# Patient Record
Sex: FEMALE | Race: WHITE | Employment: OTHER | URBAN - METROPOLITAN AREA
[De-identification: names, ages, dates, MRNs, and addresses within clinical notes are randomized per-mention and may not be internally consistent; named-entity substitution may affect disease eponyms.]

---

## 2017-08-16 ENCOUNTER — HOSPITAL ENCOUNTER (OUTPATIENT)
Dept: MAMMOGRAPHY | Age: 57
Discharge: HOME | End: 2017-08-16

## 2017-08-16 DIAGNOSIS — Z12.31 VISIT FOR SCREENING MAMMOGRAM: ICD-10-CM

## 2017-09-05 ENCOUNTER — HOSPITAL ENCOUNTER (INPATIENT)
Age: 57
LOS: 2 days | Discharge: HOME | DRG: 313 | End: 2017-09-07
Attending: EMERGENCY MEDICINE | Admitting: INTERNAL MEDICINE
Payer: COMMERCIAL

## 2017-09-05 ENCOUNTER — APPOINTMENT (OUTPATIENT)
Dept: ULTRASOUND IMAGING | Age: 57
DRG: 313 | End: 2017-09-05
Payer: COMMERCIAL

## 2017-09-05 ENCOUNTER — APPOINTMENT (OUTPATIENT)
Dept: CT IMAGING | Age: 57
DRG: 313 | End: 2017-09-05
Payer: COMMERCIAL

## 2017-09-05 ENCOUNTER — APPOINTMENT (OUTPATIENT)
Dept: GENERAL RADIOLOGY | Age: 57
DRG: 313 | End: 2017-09-05
Payer: COMMERCIAL

## 2017-09-05 DIAGNOSIS — R07.89 ATYPICAL CHEST PAIN: Primary | ICD-10-CM

## 2017-09-05 DIAGNOSIS — R74.8 ELEVATED LIVER ENZYMES: ICD-10-CM

## 2017-09-05 PROBLEM — R10.13 EPIGASTRIC ABDOMINAL PAIN: Status: ACTIVE | Noted: 2017-09-05

## 2017-09-05 PROBLEM — E11.9 TYPE 2 DIABETES MELLITUS WITHOUT COMPLICATION (HCC): Status: ACTIVE | Noted: 2017-09-05

## 2017-09-05 PROBLEM — E87.29 HIGH ANION GAP METABOLIC ACIDOSIS: Status: ACTIVE | Noted: 2017-09-05

## 2017-09-05 PROBLEM — I10 ESSENTIAL HYPERTENSION: Status: ACTIVE | Noted: 2017-09-05

## 2017-09-05 PROBLEM — R79.89 ELEVATED LFTS: Status: ACTIVE | Noted: 2017-09-05

## 2017-09-05 LAB
ALBUMIN SERPL-MCNC: 4.5 G/DL (ref 3.5–5.1)
ALP BLD-CCNC: 372 U/L (ref 38–126)
ALT SERPL-CCNC: 97 U/L (ref 11–66)
ANION GAP SERPL CALCULATED.3IONS-SCNC: 17 MEQ/L (ref 8–16)
APTT: 28.7 SECONDS (ref 22–38)
AST SERPL-CCNC: 71 U/L (ref 5–40)
BASOPHILS # BLD: 0.8 %
BASOPHILS ABSOLUTE: 0.1 THOU/MM3 (ref 0–0.1)
BILIRUB SERPL-MCNC: 0.6 MG/DL (ref 0.3–1.2)
BILIRUBIN DIRECT: < 0.2 MG/DL (ref 0–0.3)
BILIRUBIN URINE: NEGATIVE
BLOOD, URINE: NEGATIVE
BUN BLDV-MCNC: 28 MG/DL (ref 7–22)
CALCIUM SERPL-MCNC: 9.9 MG/DL (ref 8.5–10.5)
CHARACTER, URINE: CLEAR
CHLORIDE BLD-SCNC: 95 MEQ/L (ref 98–111)
CO2: 25 MEQ/L (ref 23–33)
COLOR: YELLOW
CREAT SERPL-MCNC: 0.8 MG/DL (ref 0.4–1.2)
D-DIMER QUANTITATIVE: 310 NG/ML FEU (ref 0–500)
EOSINOPHIL # BLD: 7.5 %
EOSINOPHILS ABSOLUTE: 1 THOU/MM3 (ref 0–0.4)
ETHYL ALCOHOL, SERUM: < 0.01 %
GFR SERPL CREATININE-BSD FRML MDRD: 73 ML/MIN/1.73M2
GLUCOSE BLD-MCNC: 114 MG/DL (ref 70–108)
GLUCOSE BLD-MCNC: 121 MG/DL (ref 70–108)
GLUCOSE BLD-MCNC: 123 MG/DL (ref 70–108)
GLUCOSE BLD-MCNC: 131 MG/DL (ref 70–108)
GLUCOSE URINE: NEGATIVE MG/DL
HAV IGM SER IA-ACNC: NEGATIVE
HAV IGM SER IA-ACNC: NEGATIVE
HCT VFR BLD CALC: 46 % (ref 37–47)
HEMOGLOBIN: 15.4 GM/DL (ref 12–16)
HEPATITIS B CORE IGM ANTIBODY: NEGATIVE
HEPATITIS B CORE IGM ANTIBODY: NEGATIVE
HEPATITIS B SURFACE ANTIGEN: NEGATIVE
HEPATITIS B SURFACE ANTIGEN: NEGATIVE
HEPATITIS C ANTIBODY: NEGATIVE
HEPATITIS C ANTIBODY: NEGATIVE
INR BLD: 1.03 (ref 0.85–1.13)
KETONES, URINE: NEGATIVE
LACTIC ACID: 1 MMOL/L (ref 0.5–2.2)
LEUKOCYTE ESTERASE, URINE: NEGATIVE
LIPASE: 31.3 U/L (ref 5.6–51.3)
LYMPHOCYTES # BLD: 29.7 %
LYMPHOCYTES ABSOLUTE: 3.9 THOU/MM3 (ref 1–4.8)
MAGNESIUM: 2 MG/DL (ref 1.6–2.4)
MCH RBC QN AUTO: 29.9 PG (ref 27–31)
MCHC RBC AUTO-ENTMCNC: 33.4 GM/DL (ref 33–37)
MCV RBC AUTO: 89.5 FL (ref 81–99)
MONOCYTES # BLD: 8 %
MONOCYTES ABSOLUTE: 1 THOU/MM3 (ref 0.4–1.3)
NITRITE, URINE: NEGATIVE
NUCLEATED RED BLOOD CELLS: 0 /100 WBC
OSMOLALITY CALCULATION: 281.1 MOSMOL/KG (ref 275–300)
PDW BLD-RTO: 13.9 % (ref 11.5–14.5)
PH UA: 5
PLATELET # BLD: 332 THOU/MM3 (ref 130–400)
PMV BLD AUTO: 8.1 MCM (ref 7.4–10.4)
POTASSIUM SERPL-SCNC: 3.8 MEQ/L (ref 3.5–5.2)
PRO-BNP: 15.2 PG/ML (ref 0–900)
PROTEIN UA: NEGATIVE
RBC # BLD: 5.14 MILL/MM3 (ref 4.2–5.4)
RBC # BLD: NORMAL 10*6/UL
SEG NEUTROPHILS: 54 %
SEGMENTED NEUTROPHILS ABSOLUTE COUNT: 7 THOU/MM3 (ref 1.8–7.7)
SODIUM BLD-SCNC: 137 MEQ/L (ref 135–145)
SPECIFIC GRAVITY, URINE: > 1.03 (ref 1–1.03)
TOTAL PROTEIN: 8 G/DL (ref 6.1–8)
TROPONIN T: < 0.01 NG/ML
TROPONIN T: < 0.01 NG/ML
UROBILINOGEN, URINE: 0.2 EU/DL
WBC # BLD: 13 THOU/MM3 (ref 4.8–10.8)

## 2017-09-05 PROCEDURE — 74177 CT ABD & PELVIS W/CONTRAST: CPT

## 2017-09-05 PROCEDURE — 93005 ELECTROCARDIOGRAM TRACING: CPT

## 2017-09-05 PROCEDURE — 6360000002 HC RX W HCPCS: Performed by: INTERNAL MEDICINE

## 2017-09-05 PROCEDURE — 36415 COLL VENOUS BLD VENIPUNCTURE: CPT

## 2017-09-05 PROCEDURE — C9113 INJ PANTOPRAZOLE SODIUM, VIA: HCPCS | Performed by: EMERGENCY MEDICINE

## 2017-09-05 PROCEDURE — G0378 HOSPITAL OBSERVATION PER HR: HCPCS

## 2017-09-05 PROCEDURE — 85730 THROMBOPLASTIN TIME PARTIAL: CPT

## 2017-09-05 PROCEDURE — G0480 DRUG TEST DEF 1-7 CLASSES: HCPCS

## 2017-09-05 PROCEDURE — 80053 COMPREHEN METABOLIC PANEL: CPT

## 2017-09-05 PROCEDURE — 6360000004 HC RX CONTRAST MEDICATION: Performed by: EMERGENCY MEDICINE

## 2017-09-05 PROCEDURE — 80074 ACUTE HEPATITIS PANEL: CPT

## 2017-09-05 PROCEDURE — 2580000003 HC RX 258: Performed by: INTERNAL MEDICINE

## 2017-09-05 PROCEDURE — 85610 PROTHROMBIN TIME: CPT

## 2017-09-05 PROCEDURE — 71275 CT ANGIOGRAPHY CHEST: CPT

## 2017-09-05 PROCEDURE — 85025 COMPLETE CBC W/AUTO DIFF WBC: CPT

## 2017-09-05 PROCEDURE — 82248 BILIRUBIN DIRECT: CPT

## 2017-09-05 PROCEDURE — 1200000003 HC TELEMETRY R&B

## 2017-09-05 PROCEDURE — 76705 ECHO EXAM OF ABDOMEN: CPT

## 2017-09-05 PROCEDURE — C9113 INJ PANTOPRAZOLE SODIUM, VIA: HCPCS | Performed by: INTERNAL MEDICINE

## 2017-09-05 PROCEDURE — 71020 XR CHEST STANDARD TWO VW: CPT

## 2017-09-05 PROCEDURE — 96361 HYDRATE IV INFUSION ADD-ON: CPT

## 2017-09-05 PROCEDURE — 83880 ASSAY OF NATRIURETIC PEPTIDE: CPT

## 2017-09-05 PROCEDURE — 96376 TX/PRO/DX INJ SAME DRUG ADON: CPT

## 2017-09-05 PROCEDURE — 99285 EMERGENCY DEPT VISIT HI MDM: CPT

## 2017-09-05 PROCEDURE — 6370000000 HC RX 637 (ALT 250 FOR IP): Performed by: INTERNAL MEDICINE

## 2017-09-05 PROCEDURE — 83605 ASSAY OF LACTIC ACID: CPT

## 2017-09-05 PROCEDURE — B32S1ZZ COMPUTERIZED TOMOGRAPHY (CT SCAN) OF RIGHT PULMONARY ARTERY USING LOW OSMOLAR CONTRAST: ICD-10-PCS | Performed by: RADIOLOGY

## 2017-09-05 PROCEDURE — 6370000000 HC RX 637 (ALT 250 FOR IP): Performed by: EMERGENCY MEDICINE

## 2017-09-05 PROCEDURE — 83735 ASSAY OF MAGNESIUM: CPT

## 2017-09-05 PROCEDURE — 96375 TX/PRO/DX INJ NEW DRUG ADDON: CPT

## 2017-09-05 PROCEDURE — 84484 ASSAY OF TROPONIN QUANT: CPT

## 2017-09-05 PROCEDURE — 83690 ASSAY OF LIPASE: CPT

## 2017-09-05 PROCEDURE — 81003 URINALYSIS AUTO W/O SCOPE: CPT

## 2017-09-05 PROCEDURE — 99223 1ST HOSP IP/OBS HIGH 75: CPT | Performed by: INTERNAL MEDICINE

## 2017-09-05 PROCEDURE — 82948 REAGENT STRIP/BLOOD GLUCOSE: CPT

## 2017-09-05 PROCEDURE — 2580000003 HC RX 258: Performed by: EMERGENCY MEDICINE

## 2017-09-05 PROCEDURE — 6360000002 HC RX W HCPCS: Performed by: EMERGENCY MEDICINE

## 2017-09-05 PROCEDURE — 96374 THER/PROPH/DIAG INJ IV PUSH: CPT

## 2017-09-05 PROCEDURE — 85379 FIBRIN DEGRADATION QUANT: CPT

## 2017-09-05 PROCEDURE — B32T1ZZ COMPUTERIZED TOMOGRAPHY (CT SCAN) OF LEFT PULMONARY ARTERY USING LOW OSMOLAR CONTRAST: ICD-10-PCS | Performed by: RADIOLOGY

## 2017-09-05 RX ORDER — ACETAMINOPHEN 325 MG/1
650 TABLET ORAL EVERY 4 HOURS PRN
Status: DISCONTINUED | OUTPATIENT
Start: 2017-09-05 | End: 2017-09-05

## 2017-09-05 RX ORDER — OXYCODONE HYDROCHLORIDE 5 MG/1
5 TABLET ORAL EVERY 4 HOURS PRN
Status: DISCONTINUED | OUTPATIENT
Start: 2017-09-05 | End: 2017-09-07 | Stop reason: HOSPADM

## 2017-09-05 RX ORDER — ONDANSETRON 2 MG/ML
4 INJECTION INTRAMUSCULAR; INTRAVENOUS ONCE
Status: COMPLETED | OUTPATIENT
Start: 2017-09-05 | End: 2017-09-05

## 2017-09-05 RX ORDER — CITALOPRAM 20 MG/1
20 TABLET ORAL DAILY
Status: DISCONTINUED | OUTPATIENT
Start: 2017-09-05 | End: 2017-09-07 | Stop reason: HOSPADM

## 2017-09-05 RX ORDER — MORPHINE SULFATE 4 MG/ML
4 INJECTION, SOLUTION INTRAMUSCULAR; INTRAVENOUS ONCE
Status: COMPLETED | OUTPATIENT
Start: 2017-09-05 | End: 2017-09-05

## 2017-09-05 RX ORDER — 0.9 % SODIUM CHLORIDE 0.9 %
1000 INTRAVENOUS SOLUTION INTRAVENOUS ONCE
Status: COMPLETED | OUTPATIENT
Start: 2017-09-05 | End: 2017-09-05

## 2017-09-05 RX ORDER — SODIUM CHLORIDE 9 MG/ML
INJECTION, SOLUTION INTRAVENOUS CONTINUOUS
Status: DISCONTINUED | OUTPATIENT
Start: 2017-09-05 | End: 2017-09-07 | Stop reason: HOSPADM

## 2017-09-05 RX ORDER — SUCRALFATE ORAL 1 G/10ML
1 SUSPENSION ORAL ONCE
Status: COMPLETED | OUTPATIENT
Start: 2017-09-05 | End: 2017-09-05

## 2017-09-05 RX ORDER — ONDANSETRON 2 MG/ML
4 INJECTION INTRAMUSCULAR; INTRAVENOUS EVERY 6 HOURS PRN
Status: DISCONTINUED | OUTPATIENT
Start: 2017-09-05 | End: 2017-09-07 | Stop reason: HOSPADM

## 2017-09-05 RX ORDER — MORPHINE SULFATE 2 MG/ML
2 INJECTION, SOLUTION INTRAMUSCULAR; INTRAVENOUS
Status: DISCONTINUED | OUTPATIENT
Start: 2017-09-05 | End: 2017-09-07 | Stop reason: HOSPADM

## 2017-09-05 RX ORDER — OXYCODONE HYDROCHLORIDE 5 MG/1
10 TABLET ORAL EVERY 4 HOURS PRN
Status: DISCONTINUED | OUTPATIENT
Start: 2017-09-05 | End: 2017-09-07 | Stop reason: HOSPADM

## 2017-09-05 RX ORDER — PANTOPRAZOLE SODIUM 40 MG/10ML
40 INJECTION, POWDER, LYOPHILIZED, FOR SOLUTION INTRAVENOUS DAILY
Status: DISCONTINUED | OUTPATIENT
Start: 2017-09-05 | End: 2017-09-07 | Stop reason: HOSPADM

## 2017-09-05 RX ORDER — SODIUM CHLORIDE 0.9 % (FLUSH) 0.9 %
10 SYRINGE (ML) INJECTION EVERY 12 HOURS SCHEDULED
Status: DISCONTINUED | OUTPATIENT
Start: 2017-09-05 | End: 2017-09-07 | Stop reason: HOSPADM

## 2017-09-05 RX ORDER — PANTOPRAZOLE SODIUM 40 MG/10ML
40 INJECTION, POWDER, LYOPHILIZED, FOR SOLUTION INTRAVENOUS ONCE
Status: COMPLETED | OUTPATIENT
Start: 2017-09-05 | End: 2017-09-05

## 2017-09-05 RX ORDER — LISINOPRIL 20 MG/1
20 TABLET ORAL DAILY
Status: DISCONTINUED | OUTPATIENT
Start: 2017-09-05 | End: 2017-09-07 | Stop reason: HOSPADM

## 2017-09-05 RX ORDER — OMEPRAZOLE 10 MG/1
40 CAPSULE, DELAYED RELEASE ORAL PRN
COMMUNITY
End: 2019-02-08

## 2017-09-05 RX ORDER — SODIUM CHLORIDE 0.9 % (FLUSH) 0.9 %
10 SYRINGE (ML) INJECTION PRN
Status: DISCONTINUED | OUTPATIENT
Start: 2017-09-05 | End: 2017-09-07 | Stop reason: HOSPADM

## 2017-09-05 RX ORDER — MORPHINE SULFATE 4 MG/ML
4 INJECTION, SOLUTION INTRAMUSCULAR; INTRAVENOUS
Status: DISCONTINUED | OUTPATIENT
Start: 2017-09-05 | End: 2017-09-07 | Stop reason: HOSPADM

## 2017-09-05 RX ADMIN — MORPHINE SULFATE 4 MG: 4 INJECTION, SOLUTION INTRAMUSCULAR; INTRAVENOUS at 06:16

## 2017-09-05 RX ADMIN — SUCRALFATE 1 G: 1 SUSPENSION ORAL at 04:42

## 2017-09-05 RX ADMIN — IOPAMIDOL 80 ML: 755 INJECTION, SOLUTION INTRAVENOUS at 05:55

## 2017-09-05 RX ADMIN — MORPHINE SULFATE 2 MG: 2 INJECTION, SOLUTION INTRAMUSCULAR; INTRAVENOUS at 17:48

## 2017-09-05 RX ADMIN — SODIUM CHLORIDE: 9 INJECTION, SOLUTION INTRAVENOUS at 22:15

## 2017-09-05 RX ADMIN — SODIUM CHLORIDE: 9 INJECTION, SOLUTION INTRAVENOUS at 11:18

## 2017-09-05 RX ADMIN — ENOXAPARIN SODIUM 40 MG: 40 INJECTION SUBCUTANEOUS at 11:54

## 2017-09-05 RX ADMIN — ONDANSETRON 4 MG: 2 INJECTION INTRAMUSCULAR; INTRAVENOUS at 04:27

## 2017-09-05 RX ADMIN — Medication 10 ML: at 20:45

## 2017-09-05 RX ADMIN — OXYCODONE HYDROCHLORIDE 10 MG: 5 TABLET ORAL at 11:17

## 2017-09-05 RX ADMIN — PANTOPRAZOLE SODIUM 40 MG: 40 INJECTION, POWDER, FOR SOLUTION INTRAVENOUS at 17:42

## 2017-09-05 RX ADMIN — MORPHINE SULFATE 4 MG: 4 INJECTION, SOLUTION INTRAMUSCULAR; INTRAVENOUS at 23:04

## 2017-09-05 RX ADMIN — HYDROMORPHONE HYDROCHLORIDE 1 MG: 1 INJECTION, SOLUTION INTRAMUSCULAR; INTRAVENOUS; SUBCUTANEOUS at 07:21

## 2017-09-05 RX ADMIN — ONDANSETRON 4 MG: 2 INJECTION INTRAMUSCULAR; INTRAVENOUS at 11:54

## 2017-09-05 RX ADMIN — SODIUM CHLORIDE 8 MG/HR: 9 INJECTION, SOLUTION INTRAVENOUS at 11:54

## 2017-09-05 RX ADMIN — MORPHINE SULFATE 4 MG: 4 INJECTION, SOLUTION INTRAMUSCULAR; INTRAVENOUS at 20:43

## 2017-09-05 RX ADMIN — PANTOPRAZOLE SODIUM 40 MG: 40 INJECTION, POWDER, FOR SOLUTION INTRAVENOUS at 04:27

## 2017-09-05 RX ADMIN — Medication 10 ML: at 23:06

## 2017-09-05 RX ADMIN — SODIUM CHLORIDE 1000 ML: 9 INJECTION, SOLUTION INTRAVENOUS at 04:27

## 2017-09-05 RX ADMIN — MORPHINE SULFATE 4 MG: 4 INJECTION, SOLUTION INTRAMUSCULAR; INTRAVENOUS at 05:08

## 2017-09-05 RX ADMIN — Medication 30 ML: at 04:42

## 2017-09-05 RX ADMIN — MORPHINE SULFATE 2 MG: 2 INJECTION, SOLUTION INTRAMUSCULAR; INTRAVENOUS at 13:34

## 2017-09-05 ASSESSMENT — PAIN DESCRIPTION - LOCATION
LOCATION: ABDOMEN
LOCATION: ABDOMEN

## 2017-09-05 ASSESSMENT — ENCOUNTER SYMPTOMS
EYE ITCHING: 0
EYE DISCHARGE: 0
EYE PAIN: 0
BACK PAIN: 0
ABDOMINAL PAIN: 0
VOICE CHANGE: 0
COUGH: 0
EYE REDNESS: 0
VOMITING: 0
SINUS PRESSURE: 0
CHEST TIGHTNESS: 0
ABDOMINAL DISTENTION: 0
NAUSEA: 0
SHORTNESS OF BREATH: 0
WHEEZING: 0
CONSTIPATION: 0
DIARRHEA: 0
TROUBLE SWALLOWING: 0
RHINORRHEA: 0
PHOTOPHOBIA: 0
CHOKING: 0
BLOOD IN STOOL: 0
SORE THROAT: 0

## 2017-09-05 ASSESSMENT — PAIN SCALES - GENERAL
PAINLEVEL_OUTOF10: 5
PAINLEVEL_OUTOF10: 6
PAINLEVEL_OUTOF10: 6
PAINLEVEL_OUTOF10: 7
PAINLEVEL_OUTOF10: 9
PAINLEVEL_OUTOF10: 8
PAINLEVEL_OUTOF10: 0
PAINLEVEL_OUTOF10: 8
PAINLEVEL_OUTOF10: 6
PAINLEVEL_OUTOF10: 8
PAINLEVEL_OUTOF10: 7
PAINLEVEL_OUTOF10: 7
PAINLEVEL_OUTOF10: 9
PAINLEVEL_OUTOF10: 8
PAINLEVEL_OUTOF10: 8

## 2017-09-05 ASSESSMENT — PAIN DESCRIPTION - FREQUENCY: FREQUENCY: CONTINUOUS

## 2017-09-05 ASSESSMENT — PAIN DESCRIPTION - ONSET: ONSET: ON-GOING

## 2017-09-05 ASSESSMENT — PAIN DESCRIPTION - ORIENTATION: ORIENTATION: MID

## 2017-09-05 ASSESSMENT — PAIN DESCRIPTION - DESCRIPTORS: DESCRIPTORS: SHARP

## 2017-09-05 ASSESSMENT — PAIN DESCRIPTION - PAIN TYPE
TYPE: ACUTE PAIN

## 2017-09-05 ASSESSMENT — PAIN DESCRIPTION - PROGRESSION: CLINICAL_PROGRESSION: NOT CHANGED

## 2017-09-05 NOTE — ED NOTES
Pt stable and off to Radiology US  via ED cart with [employer] tech. Pt states no concerns and vitals stable.         Sid Zeng RN  09/05/17 7319

## 2017-09-05 NOTE — ED NOTES
Pt stable and off to Radiology via ED cart with [facility] tech. Pt states no concerns and vitals stable.         Aaron Frias RN  09/05/17 8714

## 2017-09-05 NOTE — ED PROVIDER NOTES
Patient was a signout from Dr. Aracelis Garrison. Patient came in initially with complaint of epigastric pain that started two hrs prior to presentation. Patient had an extensive workup done by Dr. Aracelis Garrison, ruled out dissecting aortic aneurysm, PE, MI. She, however,had persistent pain, evaluation of her labs showed elevated elevated alk phos. Patient denies any history of cancer, gallbladder disease, liver disease. I did contact GI, patient has seen Dr. Enrike Chirinos in the past for colonoscopy, I discussed case with his group, they're willing to see patient as a consult consider EGD in the hospital.  I also had a discussion with the hospitalist, he will admit patient for further evaluation. Patient is currently stable, pain has reduced pain somewhat but still present.      Bautista Bedoya,   09/05/17 2415

## 2017-09-05 NOTE — IP AVS SNAPSHOT
Patient Information     Patient Name LUZMA Ramirez Amend 1960         This is your updated medication list to keep with you all times      TAKE these medications     citalopram 20 MG tablet   Commonly known as:  CELEXA       lisinopril 20 MG tablet   Commonly known as:  PRINIVIL;ZESTRIL       metFORMIN 500 MG tablet   Commonly known as:  GLUCOPHAGE       naproxen 500 MG tablet   Commonly known as:  NAPROSYN       omeprazole 10 MG delayed release capsule   Commonly known as:  PRILOSEC

## 2017-09-05 NOTE — IP AVS SNAPSHOT
This summary was created for you. Thank you for entrusting your care to us. The following information includes details about your hospital/visit stay along with steps you should take to help with your recovery once you leave the hospital.  In this packet, you will find information about the topics listed below:    · Instructions about your medications including a list of your home medications  · A summary of your hospital visit  · Follow-up appointments once you have left the hospital  · Your care plan at home      You may receive a survey regarding the care you received during your stay. Your input is valuable to us. We encourage you to complete and return your survey in the envelope provided. We hope you will choose us in the future for your healthcare needs. Patient Information     Patient Name LUZMA Castellanos 1960      Care Provided at:     Name Address Phone       [address] [phone]            Your Visit    Here you will find information about your visit, including the reason for your visit. Please take this sheet with you when you visit your doctor or other health care provider in the future. It will help determine the best possible medical care for you at that time. If you have any questions once you leave the hospital, please call the department phone number listed below. Why you were here     Your primary diagnosis was:  Epigastric Abdominal Pain    Your diagnoses also included:  High Anion Gap Metabolic Acidosis, Essential Hypertension, Type 2 Diabetes Mellitus Without Complication (Hcc), Elevated Lfts, Atypical Chest Pain      Visit Information     Date & Time Provider Department Dept. Phone    2017 Adriel Brown MD [facility] [phone]       Follow-up Appointments    Below is a list of your follow-up and future appointments.  This may not be a complete list as you may have made appointments directly with providers that we are not aware of or your providers may have made some for you. Please call your providers to confirm appointments. It is important to keep your appointments. Please bring your current insurance card, photo ID, co-pay, and all medication bottles to your appointment. If self-pay, payment is expected at the time of service. Follow-up Information     Follow up with Referring Not In System. Follow up with VIVIEN Esparza. Go on 9/20/2017. Specialty:  Gastroenterology    Why:  Ning Collins see Isabelle at 9:15AM.    Contact information:    Wilian Souza [facility] [address] [address] [phone]          Follow up with Tigist Peace. Brenek, CNP On 9/14/2017. Specialty:  Nurse Practitioner    Why:  Go see Kat Robles at [facility] information:    [address] [address] [phone]          Follow up with Martínez Gray MD. Go on 9/26/2017. Specialty:  Cardiology    Why:  Go to Dr. Tammy Zamorano office at 8:45AM. Bring insurance card, photo ID, and medication in the correct bottles. Contact information:    [place], [address] [address] [phone]        Future Appointments     9/20/2017 9:15 AM     Appointment with VIVIEN Esparza at [facility] ([phone])   Please arrive 15 minutes prior to appointment time, bring insurance card and photo ID. [address] [facility] [facility] [address]       9/26/2017 9:00 AM     Appointment with Martínez Gray MD at [facility] [address] ([phone])   Please arrive 15 minutes prior to appointment, bring insurance card and photo ID. Please arrive 15 minutes prior to appointment, bring insurance card and photo ID.   [address]  [address]  [facility] [address]         Preventive Care        Date Due    HIV screening is recommended for all people regardless of risk factors  aged 15-65 years at least once (lifetime) who have never been HIV tested.  1/24/1975 directed. This will help you recover and help prevent future problems. How can you care for yourself at home? · Rest until you feel better. · Take your medicine exactly as prescribed. Call your doctor if you think you are having a problem with your medicine. · Do not drive after taking a prescription pain medicine. When should you call for help? Call 911 if:  · You passed out (lost consciousness). · You have severe difficulty breathing. · You have symptoms of a heart attack. These may include:  ¨ Chest pain or pressure, or a strange feeling in your chest.  ¨ Sweating. ¨ Shortness of breath. ¨ Nausea or vomiting. ¨ Pain, pressure, or a strange feeling in your back, neck, jaw, or upper belly or in one or both shoulders or arms. ¨ Lightheadedness or sudden weakness. ¨ A fast or irregular heartbeat. After you call 911, the  may tell you to chew 1 adult-strength or 2 to 4 low-dose aspirin. Wait for an ambulance. Do not try to drive yourself. Call your doctor today if:  · You have any trouble breathing. · Your chest pain gets worse. · You are dizzy or lightheaded, or you feel like you may faint. · You are not getting better as expected. · You are having new or different chest pain. Where can you learn more? Go to [email or web address] and sign in to your Teedot account. Enter A120 in the Mobclix box to learn more about \"Chest Pain: Care Instructions. \"     If you do not have an account, please click on the \"Sign Up Now\" link. Current as of: March 20, 2017  Content Version: 11.3  © 7178-5563 [email or web address]. Care instructions adapted under license by [employer] [facility] ([facility]). If you have questions about a medical condition or this instruction, always ask your healthcare professional. [address]  any warranty or liability for your use of this information.             Teedot Signup [email or web address] allows you to send messages to your doctor, view your test results, renew your prescriptions, schedule appointments, view visit notes, and more. How Do I Sign Up? 1. In your Internet browser, go to [email or web address]  2. Click on the Sign Up Now link in the Sign In box. You will see the New Member Sign Up page. 3. Enter your [email or web address] Access Code exactly as it appears below. You will not need to use this code after youve completed the sign-up process. If you do not sign up before the expiration date, you must request a new code. [email or web address] Access Code: [email or web address]  Expires: 11/6/2017  5:56 PM    4. Enter your Social Security Number ([id number]) and Date of Birth (mm/dd/yyyy) as indicated and click Submit. You will be taken to the next sign-up page. 5. Create a hearo.fm ID. This will be your hearo.fm login ID and cannot be changed, so think of one that is secure and easy to remember. 6. Create a [email or web address] password. You can change your password at any time. 7. Enter your Password Reset Question and Answer. This can be used at a later time if you forget your password. 8. Enter your e-mail address. You will receive e-mail notification when new information is available in [address] 9. Click Sign Up. You can now view your medical record. Additional Information  If you have questions, please contact the physician practice where you receive care. Remember, [email or web address] is NOT to be used for urgent needs. For medical emergencies, dial 911. For questions regarding your [email or web address] account call [phone]. If you have a clinical question, please call your doctor's office. View your information online  ? Review your current list of  medications, immunization, and allergies. ? Review your future test results online . ?  Review your discharge instructions provided by your caregivers at discharge    Certain functionality such as prescription refills, scheduling

## 2017-09-05 NOTE — H&P
History & Physical      PCP: Referring Not In System (Inactive)    Date of Admission: 9/5/2017    Chief Complaint:    Chief Complaint   Patient presents with    Chest Pain     History Of Present Illness:    59 y.o. female who presented to [address] [place] [address] with to the ER complaining of acute onset epigastric with associated nausea and vomiting, worst with inspiration and movement, no diarrhea, has history of recently been diagnosed with type 2 diabetes and was started on metformin a few weeks ago, no palpations or SOB    Past Medical History:        Diagnosis Date    Arthritis     Depression     Diabetes mellitus ([facility]     GERD (gastroesophageal reflux disease)     Hypertension      Past Surgical History:        Procedure Laterality Date    BUNIONECTOMY      HEEL SPUR SURGERY      HYSTERECTOMY      INCONTINENCE SURGERY      TUBAL LIGATION       Medications Prior to Admission:    Prior to Admission medications    Medication Sig Start Date End Date Taking? Authorizing Provider   omeprazole (PRILOSEC) 10 MG delayed release capsule Take 10 mg by mouth daily   Yes Historical Provider, MD   metFORMIN (GLUCOPHAGE) 500 MG tablet Take 500 mg by mouth 2 times daily (with meals)   Yes Historical Provider, MD   naproxen (NAPROSYN) 500 MG tablet Take 500 mg by mouth 2 times daily as needed for Pain. Historical Provider, MD   citalopram (CELEXA) 20 MG tablet Take 20 mg by mouth daily. Historical Provider, MD   lisinopril (PRINIVIL;ZESTRIL) 20 MG tablet Take 20 mg by mouth daily. Historical Provider, MD     Allergies:  Erythromycin  Social History:    Social History   Substance Use Topics    Smoking status: Never Smoker    Smokeless tobacco: None    Alcohol use No     Family History:        Problem Relation Age of Onset    Liver Disease Father     Heart Disease Father      REVIEW OF SYSTEMS:    REVIEW OF SYSTEMS:   GENERAL: No weight change, change in appetite, thirst, fever or chills.    HEENT: Diagnosis Date Noted    Epigastric abdominal pain [R10.13] 09/05/2017     Priority: High    Elevated LFTs [R94.5] 09/05/2017     Priority: Medium    High anion gap metabolic acidosis [L04.9] 09/05/2017    Essential hypertension [I10] 09/05/2017    Type 2 diabetes mellitus without complication ([address] [L69.7] 09/05/2017       PLAN:  · Etiology unclear, DD acute hepatitis, given elevated LFTs, hepatitis panel was negative, keep NPO, continue PPI, consult GI for recommendations  · US consistent with hepatomegaly, avoid APAP/hepatotoxins  · Check lactic acid, hydrate  · Continue Lisinopril  · Metformin on hold, until symptoms improve     Diet NPO Effective Now  Thank you Referring Not In System (Inactive) for the opportunity to be involved in this patient's care.     Electronically signed by Fatimah Jack MD on 9/5/2017 at 3:50 PM

## 2017-09-05 NOTE — ED PROVIDER NOTES
immunocompromised state. Neurological: Negative for dizziness, tremors, seizures, syncope, facial asymmetry, weakness, light-headedness, numbness and headaches. Hematological: Negative for adenopathy. Does not bruise/bleed easily. Psychiatric/Behavioral: Negative for agitation, hallucinations and suicidal ideas. The patient is not nervous/anxious. PAST MEDICAL HISTORY    has a past medical history of Arthritis; Depression; Diabetes mellitus ([address] GERD (gastroesophageal reflux disease); and Hypertension. SURGICAL HISTORY      has a past surgical history that includes Hysterectomy; Tubal ligation; Bunionectomy; and Heel spur surgery. CURRENT MEDICATIONS       Previous Medications    CITALOPRAM (CELEXA) 20 MG TABLET    Take 20 mg by mouth daily. LISINOPRIL (PRINIVIL;ZESTRIL) 20 MG TABLET    Take 20 mg by mouth daily. METFORMIN (GLUCOPHAGE) 500 MG TABLET    Take 500 mg by mouth 2 times daily (with meals)    NAPROXEN (NAPROSYN) 500 MG TABLET    Take 500 mg by mouth 2 times daily as needed for Pain. NEOMYCIN-POLYMYXIN-HYDROCORTISONE (CORTISPORIN) [id number] OTIC SOLUTION    3 drops in affected ear four times a day for 5 days. OMEPRAZOLE (PRILOSEC) 10 MG DELAYED RELEASE CAPSULE    Take 10 mg by mouth daily       ALLERGIES     is allergic to erythromycin. FAMILY HISTORY     has no family status information on file. family history is not on file. SOCIAL HISTORY      reports that she has never smoked. She does not have any smokeless tobacco history on file. She reports that she does not drink alcohol or use illicit drugs. PHYSICAL EXAM     INITIAL VITALS:  height is 5' 2\" (1.575 m) and weight is 225 lb (102.1 kg). Her oral temperature is 97.7 °F (36.5 °C). Her blood pressure is 122/80 and her pulse is 74. Her respiration is 15 and oxygen saturation is 98%. Physical Exam   Constitutional: She is oriented to person, place, and time. She appears well-developed and well-nourished.  No film images(s) such as CT, Ultrasound and MRI are read by the radiologist.  [address]   Final Result       1. No pulmonary emboli or pulmonary infiltrates. 2. Small lymph nodes in the jina hepatis and a small epicardial lymph node. These have increased in size. Clinical correlation is recommended. **This report has been created using voice recognition software. It may contain minor errors which are inherent in voice recognition technology. **      Final report electronically signed by Dr. Saeed Varghese on 9/5/2017 7:22 AM      XR Chest Standard TWO VW   ED Interpretation   No acute cardiopulmonary processes. CT ABDOMEN PELVIS W IV CONTRAST Additional Contrast? None         US GALLBLADDER RUQ            CTA CHEST W WO CONTRAST (Preliminary result) Result time: 09/05/17 06:45:28     Preliminary result by Edi, Melony Peabody Incoming Radiant Results From [facility] (09/05/17 06:45:28)     Narrative:       PRELIMINARY REPORT    EXAM:  CTA CHEST W WO CONTRAST    HISTORY:  Chest pain through to back     TECHNIQUE:  CT angiography of the chest was performed. Images were obtained after the administration of IV contrast.  Coronal and sagittal reformatted images were obtained. PRIORS:  None. FINDINGS: Digna Paredes is no aortic dissection seen. There is no significant mediastinal or hilar mass seen. There are no filling defects seen within the pulmonary arterial circulation to suggest pulmonary embolism. There is a calcified granuloma in the left lower lobe. There is no acute infiltrate. There are no pleural effusions seen. There is no pneumothorax seen. Impression: Digna Paredes is no pulmonary embolism or aortic dissection seen. There is no acute abnormality identified.       Electronically Signed By: Emily Gallagher                  CT ABDOMEN PELVIS W IV CONTRAST Additional Contrast? None (Preliminary result) Result time: 09/05/17 06:55:03     Preliminary result by Edi, Melony Peabody Incoming lesion is seen, the possibility of numerous liver lesions not excluded. Unremarkable gallbladder. Electronically Signed By: Geni Mckeon                LABS:   Labs Reviewed   CBC WITH AUTO DIFFERENTIAL - Abnormal; Notable for the following:        Result Value    WBC 13.0 (*)     Eosinophils # 1.0 (*)     All other components within normal limits   BASIC METABOLIC PANEL - Abnormal; Notable for the following:     Chloride 95 (*)     Glucose 131 (*)     BUN 28 (*)     All other components within normal limits   HEPATIC FUNCTION PANEL - Abnormal; Notable for the following:     Alkaline Phosphatase 372 (*)     AST 71 (*)     ALT 97 (*)     All other components within normal limits   ANION GAP - Abnormal; Notable for the following: Anion Gap 17.0 (*)     All other components within normal limits   GLOMERULAR FILTRATION RATE, ESTIMATED - Abnormal; Notable for the following:     Est, Glom Filt Rate 73 (*)     All other components within normal limits   BRAIN NATRIURETIC PEPTIDE   TROPONIN   MAGNESIUM   LIPASE   ETHANOL   D-DIMER, QUANTITATIVE   OSMOLALITY   HEPATITIS PANEL, ACUTE   TROPONIN   URINE RT REFLEX TO CULTURE   APTT   PROTIME-INR       EMERGENCY DEPARTMENT COURSE:   Vitals:    Vitals:    09/05/17 0419 09/05/17 0617 09/05/17 0720   BP: (!) 144/74 124/77 122/80   Pulse: 78 78 74   Resp: 21 13 15   Temp: 97.7 °F (36.5 °C)     TempSrc: Oral     SpO2: 98% 100% 98%   Weight: 225 lb (102.1 kg)     Height: 5' 2\" (1.575 m)         4:30 AM: The patient was seen and evaluated. Patient was given reflux medication including Protonix Zofran and GI Cocktail and Carafate. She was diaphoretic and sweaty, she was complaining of some mild abdominal pain as well. Initial troponin was negative. I did give her morphine for her pain. She was doing much better she wanted to have a gallbladder ultrasound done which was essentially normal.  She did come back and have some pain.   CTA of her chest was negative CT of her abdomen was negative for acute abnormalities. Repeat troponin was obtained. It was negative. Patient did have elevated liver enzymes and still had continued pain. PT/PTT was ordered. My only thought was that we had a process in evolution. The patient's common bile duct was normal in caliber. She does not have the Charcot triad or Flores pentad for ascending cholangitis or primary biliary sclerosis at this time. Call was placed to GI. At this point I came to the end of my shift and signed the patient out to morning colleague in stable condition. CRITICAL CARE:   None    CONSULTS:  GI    PROCEDURES:  None     FINAL IMPRESSION      1. Atypical chest pain    2. Elevated liver enzymes          DISPOSITION/PLAN   Ed obs     PATIENT REFERRED TO:  No follow-up provider specified. DISCHARGE MEDICATIONS:  New Prescriptions    No medications on file       (Please note that portions of this note were completed with a voice recognition program.  Efforts were made to edit the dictations but occasionally words are mis-transcribed.)    Scribe:  Liya Pendleton 9/5/17 4:30 AM Scribing for and in the presence of Kimberley Coughlin DO. Signed by: Jd Barbosa 9/5/17 7:27 AM    Provider:  I personally performed the services described in the documentation, reviewed and edited the documentation which was dictated to the scribe in my presence, and it accurately records my words and actions.     Kimberley Coughlin DO 9/5/17 7:27 AM       Kimebrley Coughlin DO  09/05/17 7829

## 2017-09-05 NOTE — ED NOTES
Patient provided urine sample at this time. States pain is still 9/10. 1mg of dilaudid administered for pain. Patient requesting something to drink. Writer asked patient how she was feeling d/t stating she threw up in Xray. Patient states, \"ok maybe I better wait. \"      Meghan Tolentino RN  09/05/17 9807

## 2017-09-05 NOTE — ED NOTES
.Pt admitted to hospital. Transported to floor by cart in stable condition to [facility]. Talked to  staff member Sharron Baez.      Daisy Longoria, EMT-P  09/05/17 9866

## 2017-09-05 NOTE — IP AVS SNAPSHOT
a complete list as you may have made appointments directly with providers that we are not aware of or your providers may have made some for you. Please call your providers to confirm appointments. It is important to keep your appointments. Please bring your current insurance card, photo ID, co-pay, and all medication bottles to your appointment. If self-pay, payment is expected at the time of service. Follow-up Information     Follow up with Referring Not In System. Follow up with VIVIEN Kruger. Go on 9/20/2017. Specialty:  Gastroenterology    Why:  Ulysses Jefferson see Isabelle at 9:15AM.    Contact information:    [address] [facility] [address]  [phone]          Follow up with Maisha Reyez CNP On 9/14/2017. Specialty:  Nurse Practitioner    Why:  Go see Donny Thakur at [facility] information:    [address] [facility] [id number] [phone]          Follow up with Carissa Toledo MD. Go on 9/26/2017. Specialty:  Cardiology    Why:  Go to Dr. Lyndsay Todd office at 8:45AM. Bring insurance card, photo ID, and medication in the correct bottles. Contact information:    [place], [address] [address] [phone]        Future Appointments     9/20/2017 9:15 AM     Appointment with VIVIEN Kruger at [facility] ([phone])   Please arrive 15 minutes prior to appointment time, bring insurance card and photo ID. [address] [facility] [id number]       9/26/2017 9:00 AM     Appointment with Carissa Toledo MD at [facility] ([phone])   Please arrive 15 minutes prior to appointment, bring insurance card and photo ID. Please arrive 15 minutes prior to appointment, bring insurance card and photo ID.   [address]  [address]  [facility] [address]         Preventive Care        Date Due    HIV screening is recommended for all people regardless of risk factors  aged 15-65 years at least once (lifetime) who have never been HIV tested.  1/24/1973 Tetanus Combination Vaccine (1 - Tdap) 1/24/1977    Pap Smear 1/24/1979    Diabetes Screening 1/24/1998    Colonoscopy 1/24/2008    Yearly Flu Vaccine (1) 9/1/2017    Mammograms are recommended every 2 years for low/average risk patients aged 48 - 69, and every year for high risk patients per updated national guidelines. However these guidelines can be individualized by your provider. 8/16/2019    Cholesterol Screening 9/7/2022                 Care Plan Once You Return Home    This section includes instructions you will need to follow once you leave the hospital.  Your care team will discuss these with you, so you and those caring for you know how to best care for your health needs at home. This section may also include educational information about certain health topics that may be of help to you. Discharge Instructions            Chest Pain: Care Instructions  Your Care Instructions  There are many things that can cause chest pain. Some are not serious and will get better on their own in a few days. But some kinds of chest pain need more testing and treatment. Your doctor may have recommended a follow-up visit in the next 8 to 12 hours. If you are not getting better, you may need more tests or treatment. Even though your doctor has released you, you still need to watch for any problems. The doctor carefully checked you, but sometimes problems can develop later. If you have new symptoms or if your symptoms do not get better, get medical care right away. If you have worse or different chest pain or pressure that lasts more than 5 minutes or you passed out (lost consciousness), call 911 or seek other emergency help right away. A medical visit is only one step in your treatment.  Even if you feel better, you still need to do what your doctor recommends, such as going to all suggested follow-up appointments and taking medicines exactly as directed. This will help you recover and help prevent future problems. How can you care for yourself at home? · Rest until you feel better. · Take your medicine exactly as prescribed. Call your doctor if you think you are having a problem with your medicine. · Do not drive after taking a prescription pain medicine. When should you call for help? Call 911 if:  · You passed out (lost consciousness). · You have severe difficulty breathing. · You have symptoms of a heart attack. These may include:  ¨ Chest pain or pressure, or a strange feeling in your chest.  ¨ Sweating. ¨ Shortness of breath. ¨ Nausea or vomiting. ¨ Pain, pressure, or a strange feeling in your back, neck, jaw, or upper belly or in one or both shoulders or arms. ¨ Lightheadedness or sudden weakness. ¨ A fast or irregular heartbeat. After you call 911, the  may tell you to chew 1 adult-strength or 2 to 4 low-dose aspirin. Wait for an ambulance. Do not try to drive yourself. Call your doctor today if:  · You have any trouble breathing. · Your chest pain gets worse. · You are dizzy or lightheaded, or you feel like you may faint. · You are not getting better as expected. · You are having new or different chest pain. Where can you learn more? Go to [email or web address] and sign in to your [employer] account. Enter A120 in the Cod[employer]edia box to learn more about \"Chest Pain: Care Instructions. \"     If you do not have an account, please click on the \"Sign Up Now\" link. Current as of: March 20, 2017  Content Version: 11.3  © 0450-3672 [employer]. Care instructions adapted under license by [employer] [facility] ([facility]). If you have questions about a medical condition or this instruction, always ask your healthcare professional. [address]  any warranty or liability for your use of this information.             [employer] Signup [employer] allows you to send messages to your doctor, view your test results, renew your prescriptions, schedule appointments, view visit notes, and more. How Do I Sign Up? 1. In your Internet browser, go to [email or web address]  2. Click on the Sign Up Now link in the Sign In box. You will see the New Member Sign Up page. 3. Enter your [employer] Access Code exactly as it appears below. You will not need to use this code after youve completed the sign-up process. If you do not sign up before the expiration date, you must request a new code. [employer] Access Code: [email or web address]  Expires: 11/6/2017  5:56 PM    4. Enter your Social Security Number ([id number]) and Date of Birth (mm/dd/yyyy) as indicated and click Submit. You will be taken to the next sign-up page. 5. Create a [employer] ID. This will be your [employer] login ID and cannot be changed, so think of one that is secure and easy to remember. 6. Create a [employer] password. You can change your password at any time. 7. Enter your Password Reset Question and Answer. This can be used at a later time if you forget your password. 8. Enter your e-mail address. You will receive e-mail notification when new information is available in [address] 9. Click Sign Up. You can now view your medical record. Additional Information  If you have questions, please contact the physician practice where you receive care. Remember, [employer] is NOT to be used for urgent needs. For medical emergencies, dial 911. For questions regarding your [employer] account call [phone]. If you have a clinical question, please call your doctor's office. View your information online  ? Review your current list of  medications, immunization, and allergies. ? Review your future test results online . ?  Review your discharge instructions provided by your caregivers at discharge    Certain functionality such as prescription refills, scheduling

## 2017-09-06 LAB
ALBUMIN SERPL-MCNC: 3.7 G/DL (ref 3.5–5.1)
ALP BLD-CCNC: 282 U/L (ref 38–126)
ALT SERPL-CCNC: 60 U/L (ref 11–66)
ANION GAP SERPL CALCULATED.3IONS-SCNC: 13 MEQ/L (ref 8–16)
AST SERPL-CCNC: 31 U/L (ref 5–40)
BASOPHILS # BLD: 0.7 %
BASOPHILS ABSOLUTE: 0.1 THOU/MM3 (ref 0–0.1)
BILIRUB SERPL-MCNC: 0.9 MG/DL (ref 0.3–1.2)
BUN BLDV-MCNC: 15 MG/DL (ref 7–22)
CALCIUM SERPL-MCNC: 9.2 MG/DL (ref 8.5–10.5)
CHLORIDE BLD-SCNC: 96 MEQ/L (ref 98–111)
CO2: 23 MEQ/L (ref 23–33)
CREAT SERPL-MCNC: 0.5 MG/DL (ref 0.4–1.2)
EKG ATRIAL RATE: 86 BPM
EKG P AXIS: 30 DEGREES
EKG P-R INTERVAL: 142 MS
EKG Q-T INTERVAL: 374 MS
EKG QRS DURATION: 82 MS
EKG QTC CALCULATION (BAZETT): 447 MS
EKG R AXIS: -17 DEGREES
EKG T AXIS: 22 DEGREES
EKG VENTRICULAR RATE: 86 BPM
EOSINOPHIL # BLD: 4 %
EOSINOPHILS ABSOLUTE: 0.5 THOU/MM3 (ref 0–0.4)
GFR SERPL CREATININE-BSD FRML MDRD: > 90 ML/MIN/1.73M2
GLUCOSE BLD-MCNC: 108 MG/DL (ref 70–108)
GLUCOSE BLD-MCNC: 125 MG/DL (ref 70–108)
GLUCOSE BLD-MCNC: 217 MG/DL (ref 70–108)
GLUCOSE BLD-MCNC: 231 MG/DL (ref 70–108)
GLUCOSE BLD-MCNC: 92 MG/DL (ref 70–108)
HCT VFR BLD CALC: 40.6 % (ref 37–47)
HEMOGLOBIN: 13.6 GM/DL (ref 12–16)
LYMPHOCYTES # BLD: 19.4 %
LYMPHOCYTES ABSOLUTE: 2.3 THOU/MM3 (ref 1–4.8)
MCH RBC QN AUTO: 30.3 PG (ref 27–31)
MCHC RBC AUTO-ENTMCNC: 33.4 GM/DL (ref 33–37)
MCV RBC AUTO: 90.5 FL (ref 81–99)
MONOCYTES # BLD: 8.9 %
MONOCYTES ABSOLUTE: 1.1 THOU/MM3 (ref 0.4–1.3)
NUCLEATED RED BLOOD CELLS: 0 /100 WBC
PDW BLD-RTO: 13.9 % (ref 11.5–14.5)
PLATELET # BLD: 277 THOU/MM3 (ref 130–400)
PMV BLD AUTO: 8.1 MCM (ref 7.4–10.4)
POTASSIUM SERPL-SCNC: 4.2 MEQ/L (ref 3.5–5.2)
RBC # BLD: 4.49 MILL/MM3 (ref 4.2–5.4)
RBC # BLD: NORMAL 10*6/UL
SEG NEUTROPHILS: 67 %
SEGMENTED NEUTROPHILS ABSOLUTE COUNT: 8.1 THOU/MM3 (ref 1.8–7.7)
SODIUM BLD-SCNC: 132 MEQ/L (ref 135–145)
TOTAL PROTEIN: 7 G/DL (ref 6.1–8)
TROPONIN T: < 0.01 NG/ML
WBC # BLD: 12.1 THOU/MM3 (ref 4.8–10.8)

## 2017-09-06 PROCEDURE — G0378 HOSPITAL OBSERVATION PER HR: HCPCS

## 2017-09-06 PROCEDURE — 7100000001 HC PACU RECOVERY - ADDTL 15 MIN: Performed by: INTERNAL MEDICINE

## 2017-09-06 PROCEDURE — 6360000002 HC RX W HCPCS: Performed by: INTERNAL MEDICINE

## 2017-09-06 PROCEDURE — 82948 REAGENT STRIP/BLOOD GLUCOSE: CPT

## 2017-09-06 PROCEDURE — C9113 INJ PANTOPRAZOLE SODIUM, VIA: HCPCS | Performed by: INTERNAL MEDICINE

## 2017-09-06 PROCEDURE — 80053 COMPREHEN METABOLIC PANEL: CPT

## 2017-09-06 PROCEDURE — 99223 1ST HOSP IP/OBS HIGH 75: CPT | Performed by: INTERNAL MEDICINE

## 2017-09-06 PROCEDURE — 2580000003 HC RX 258: Performed by: INTERNAL MEDICINE

## 2017-09-06 PROCEDURE — 0DB38ZX EXCISION OF LOWER ESOPHAGUS, VIA NATURAL OR ARTIFICIAL OPENING ENDOSCOPIC, DIAGNOSTIC: ICD-10-PCS | Performed by: INTERNAL MEDICINE

## 2017-09-06 PROCEDURE — 93005 ELECTROCARDIOGRAM TRACING: CPT

## 2017-09-06 PROCEDURE — G8978 MOBILITY CURRENT STATUS: HCPCS

## 2017-09-06 PROCEDURE — 88305 TISSUE EXAM BY PATHOLOGIST: CPT

## 2017-09-06 PROCEDURE — 1200000003 HC TELEMETRY R&B

## 2017-09-06 PROCEDURE — 99232 SBSQ HOSP IP/OBS MODERATE 35: CPT | Performed by: PHYSICIAN ASSISTANT

## 2017-09-06 PROCEDURE — 85025 COMPLETE CBC W/AUTO DIFF WBC: CPT

## 2017-09-06 PROCEDURE — 3609012400 HC EGD TRANSORAL BIOPSY SINGLE/MULTIPLE: Performed by: INTERNAL MEDICINE

## 2017-09-06 PROCEDURE — 97161 PT EVAL LOW COMPLEX 20 MIN: CPT

## 2017-09-06 PROCEDURE — G8979 MOBILITY GOAL STATUS: HCPCS

## 2017-09-06 PROCEDURE — G8980 MOBILITY D/C STATUS: HCPCS

## 2017-09-06 PROCEDURE — 36415 COLL VENOUS BLD VENIPUNCTURE: CPT

## 2017-09-06 PROCEDURE — 99152 MOD SED SAME PHYS/QHP 5/>YRS: CPT | Performed by: INTERNAL MEDICINE

## 2017-09-06 PROCEDURE — 6370000000 HC RX 637 (ALT 250 FOR IP): Performed by: NURSE PRACTITIONER

## 2017-09-06 PROCEDURE — 84484 ASSAY OF TROPONIN QUANT: CPT

## 2017-09-06 PROCEDURE — 7100000000 HC PACU RECOVERY - FIRST 15 MIN: Performed by: INTERNAL MEDICINE

## 2017-09-06 RX ORDER — METHYLPREDNISOLONE SODIUM SUCCINATE 40 MG/ML
40 INJECTION, POWDER, LYOPHILIZED, FOR SOLUTION INTRAMUSCULAR; INTRAVENOUS EVERY 6 HOURS
Status: COMPLETED | OUTPATIENT
Start: 2017-09-06 | End: 2017-09-07

## 2017-09-06 RX ORDER — KETOROLAC TROMETHAMINE 30 MG/ML
15 INJECTION, SOLUTION INTRAMUSCULAR; INTRAVENOUS EVERY 6 HOURS
Status: COMPLETED | OUTPATIENT
Start: 2017-09-06 | End: 2017-09-07

## 2017-09-06 RX ORDER — FENTANYL CITRATE 50 UG/ML
INJECTION, SOLUTION INTRAMUSCULAR; INTRAVENOUS PRN
Status: DISCONTINUED | OUTPATIENT
Start: 2017-09-06 | End: 2017-09-06 | Stop reason: HOSPADM

## 2017-09-06 RX ORDER — DEXTROSE MONOHYDRATE 25 G/50ML
12.5 INJECTION, SOLUTION INTRAVENOUS PRN
Status: DISCONTINUED | OUTPATIENT
Start: 2017-09-06 | End: 2017-09-07 | Stop reason: HOSPADM

## 2017-09-06 RX ORDER — NICOTINE POLACRILEX 4 MG
15 LOZENGE BUCCAL PRN
Status: DISCONTINUED | OUTPATIENT
Start: 2017-09-06 | End: 2017-09-07 | Stop reason: HOSPADM

## 2017-09-06 RX ORDER — MIDAZOLAM HYDROCHLORIDE 1 MG/ML
INJECTION INTRAMUSCULAR; INTRAVENOUS PRN
Status: DISCONTINUED | OUTPATIENT
Start: 2017-09-06 | End: 2017-09-06 | Stop reason: HOSPADM

## 2017-09-06 RX ORDER — DEXTROSE MONOHYDRATE 50 MG/ML
100 INJECTION, SOLUTION INTRAVENOUS PRN
Status: DISCONTINUED | OUTPATIENT
Start: 2017-09-06 | End: 2017-09-07 | Stop reason: HOSPADM

## 2017-09-06 RX ADMIN — SODIUM CHLORIDE: 9 INJECTION, SOLUTION INTRAVENOUS at 13:26

## 2017-09-06 RX ADMIN — ENOXAPARIN SODIUM 40 MG: 40 INJECTION SUBCUTANEOUS at 18:09

## 2017-09-06 RX ADMIN — PANTOPRAZOLE SODIUM 40 MG: 40 INJECTION, POWDER, FOR SOLUTION INTRAVENOUS at 08:52

## 2017-09-06 RX ADMIN — Medication 1 UNITS: at 23:48

## 2017-09-06 RX ADMIN — KETOROLAC TROMETHAMINE 15 MG: 30 INJECTION, SOLUTION INTRAMUSCULAR at 18:10

## 2017-09-06 RX ADMIN — METHYLPREDNISOLONE SODIUM SUCCINATE 40 MG: 40 INJECTION, POWDER, FOR SOLUTION INTRAMUSCULAR; INTRAVENOUS at 18:10

## 2017-09-06 RX ADMIN — MORPHINE SULFATE 4 MG: 4 INJECTION, SOLUTION INTRAMUSCULAR; INTRAVENOUS at 03:30

## 2017-09-06 RX ADMIN — Medication 10 ML: at 08:53

## 2017-09-06 ASSESSMENT — PAIN SCALES - GENERAL
PAINLEVEL_OUTOF10: 6
PAINLEVEL_OUTOF10: 7
PAINLEVEL_OUTOF10: 7
PAINLEVEL_OUTOF10: 0
PAINLEVEL_OUTOF10: 4
PAINLEVEL_OUTOF10: 5

## 2017-09-06 ASSESSMENT — PAIN DESCRIPTION - PAIN TYPE: TYPE: ACUTE PAIN

## 2017-09-06 ASSESSMENT — PAIN - FUNCTIONAL ASSESSMENT: PAIN_FUNCTIONAL_ASSESSMENT: 0-10

## 2017-09-06 NOTE — PROGRESS NOTES
[facility]  OCCUPATIONAL THERAPY MISSED TREATMENT NOTE  ACUTE CARE    Date: 2017  Patient Name: Fabian Og        CSN: [id number]   : 1960  (59 y.o.)  Gender: female                REASON FOR MISSED TREATMENT:  Missed Treat. Per PT ismael, Pt is indep and doesn't require OT at this time.

## 2017-09-06 NOTE — PROGRESS NOTES
[address] [address]  INPATIENT PHYSICAL THERAPY  EVALUATION  STRZ MED SURG 8B    Time In: 8469  Time Out: 2339  Timed Code Treatment Minutes: 0 Minutes  Minutes: 14          Date: 2017  Patient Name: Rowan Orr,  Gender:  female        MRN: [id number]  : 1960  (59 y.o.)  Referral Date : 17   Referring Practitioner: Char Trejo MD  Diagnosis: epigastrc pain  Additional Pertinent Hx: per ER note: Rowan Orr is a 59 y.o. female who presents to the Emergency Department for the evaluation of non-radiating, sudden onset, mid sternal chest pain for the past 90 minutes. The pt states the pain began while laying in bed. She has a h/o GERD and reports eating steak fajitas for supper last night. She denies any alcohol or drug use. She denies any personal or family h/o heart attack, DVT, or PE. She denies any shortness of breath, fever, chills, diaphoresis, abdominal pain, nausea, vomiting, diarrhea, or cough. No further complaints at the time of the initial encounter. Past Medical History:   Diagnosis Date    Arthritis     Depression     Diabetes mellitus (HCC)     GERD (gastroesophageal reflux disease)     Hypertension      Past Surgical History:   Procedure Laterality Date    BUNIONECTOMY      HEEL SPUR SURGERY      HYSTERECTOMY      INCONTINENCE SURGERY      TUBAL LIGATION         Restrictions/Precautions:  Restrictions/Precautions: General Precautions                      Subjective:  Chart Reviewed: Yes  Patient assessed for rehabilitation services?: Yes  Family / Caregiver Present: Yes  Subjective: RN approved millial. patient in bed on arrival with daughter in room. Agrees to therapy and reports she has no concerns with going home, wants to return to work ASAP    General:  Overall Orientation Status: Within Normal Limits  Follows Commands: Within Functional Limits    Vision: Within Functional Limits    Hearing: Within functional limits         Pain:  Denies.           Social/Functional

## 2017-09-06 NOTE — PROGRESS NOTES
Hospitalist Progress Note    Patient: Andrés Hand      Unit/Bed:8B-35/035-A    YOB: 1960    MRN: [id number]       Acct: [de-identified]     PCP: Anayeli Reyez CNP    Date of Admission: 9/5/2017    Chief Complaint: chest pain    Hospital Course: This patient was admitted to [facility] on 9/5/17 for chief complaints of chest pain with nausea and vomiting. Found to have elevated LFT, hepatitis panel negative. GI consulted and was initially seen by GI associates but the patient was discharged from practice previously so Dr. Barbara Roberts was consulted. He is planning EGD this afternoon. Subjective: Patient is about to be taken for EGD. She reports continued midsternal/epigastric pain. She denies nausea and vomiting. Denies fever, chills, SOB, vomiting, urinary or bowel changes. Medications:  Reviewed    Infusion Medications    sodium chloride 75 mL/hr at 09/06/17 1326     Scheduled Medications    citalopram  20 mg Oral Daily    lisinopril  20 mg Oral Daily    sodium chloride flush  10 mL Intravenous 2 times per day    enoxaparin  40 mg Subcutaneous Q24H    pantoprazole  40 mg Intravenous Daily     PRN Meds: sodium chloride flush, magnesium sulfate, oxyCODONE **OR** oxyCODONE, morphine **OR** morphine, magnesium hydroxide, ondansetron      Intake/Output Summary (Last 24 hours) at 09/06/17 1448  Last data filed at 09/06/17 0326   Gross per 24 hour   Intake           1031.3 ml   Output                0 ml   Net           1031.3 ml       Diet:  Diet NPO Effective Now    Exam:  /60  Pulse 75  Temp 98.7 °F (37.1 °C) (Oral)   Resp 16  Ht 5' 2\" (1.575 m)  Wt 220 lb 12.8 oz (100.2 kg)  SpO2 95%  BMI 40.38 kg/m2    General appearance: No apparent distress, appears stated age and cooperative. HEENT: Pupils equal, round, and reactive to light. Conjunctivae/corneas clear. Neck: Supple, with full range of motion. No jugular venous distention. Trachea midline.   Respiratory:  Normal respiratory effort. Clear to auscultation, bilaterally without Rales/Wheezes/Rhonchi. Cardiovascular: Regular rate and rhythm with normal S1/S2 without murmurs, rubs or gallops. No reproducible chest tenderness. Abdomen: Soft, non-tender, non-distended with normal bowel sounds. Musculoskeletal: No clubbing, cyanosis or edema bilaterally. Full range of motion without deformity. Skin: Skin color, texture, turgor normal.  No rashes or lesions. Neurologic:  Neurovascularly intact without any focal sensory/motor deficits. Cranial nerves: II-XII intact, grossly non-focal.  Psychiatric: Alert and oriented, thought content appropriate, normal insight  Capillary Refill: Brisk,< 3 seconds   Peripheral Pulses: +2 palpable, equal bilaterally       Labs:   Recent Labs      09/05/17 0427 09/06/17   0539   WBC  13.0*  12.1*   HGB  15.4  13.6   HCT  46.0  40.6   PLT  332  277     Recent Labs      09/05/17   0427 09/06/17   0539   NA  137  132*   K  3.8  4.2   CL  95*  96*   CO2  25  23   BUN  28*  15   CREATININE  0.8  0.5   CALCIUM  9.9  9.2     Recent Labs      09/05/17   0427  09/06/17   0539   AST  71*  31   ALT  97*  60   BILIDIR  <0.2   --    BILITOT  0.6  0.9   ALKPHOS  372*  282*     Recent Labs      09/05/17   0752   INR  1.03     Urinalysis:    Lab Results   Component Value Date    NITRU NEGATIVE 09/05/2017    WBCUA 5 05/09/2014    BACTERIA NONE 05/09/2014    RBCUA 5 05/09/2014    BLOODU NEGATIVE 09/05/2017    GLUCOSEU NEGATIVE 09/05/2017       Radiology:  US GALLBLADDER RUQ   Final Result   1. The visualized liver appears enlarged and diffusely heterogeneous in echogenicity, nonspecific and incompletely evaluated. 2. The gallbladder is unremarkable. 3. The preliminary results were transmitted to the appropriate clinical service by Nando Tobar M.D. of [facility] on 9/5/2017. **This report has been created using voice recognition software.   It may contain minor errors which are inherent in  Elevated LFTs [R94.5] 09/05/2017       1. Chest Pain--persistent mid/epigastric pain. troponin (-)x2, bnp 15.2, d-dimer wnl, CTA of chest: no PE or infiltrates. EGD this afternoon. 2. Nausea and Vomiting--CT of ab/pelvis: negative acute findings. to have EGD by Dr. Haley Cullen this afternoon. 3. Elevated LFT--much improved, GI on board. Acute hepatitis panel negative. 4. Leukocytosis--decreasing, down to 12.1 from 13.0 on 9/5. Afebrile, CT no infiltrates. U/A on 9/5 negative. Possibly reactive, recheck in am  5. Hyponatremia--mild 132, monitor. On IV fluids. Recheck in am   6. Essential Hypertension, controlled--continue Lisinopril  7. Recent Diagnosed DM--blood glucose 92, 108. Metformin on hold. Carb controlled diet, check A1c in am   8. 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 Update: Per Dr. Haley Cullen, EGD was normal. He is recommending cardiology consult. Will place order. Repeat troponin, labs in am. Follow cardiology recommendations.        I have discussed this patient's care and plan with Dr. Vahid Pierre   Electronically signed by Dennise Littlejohn PA-C on 9/6/2017 at 2:48 PM

## 2017-09-07 ENCOUNTER — APPOINTMENT (OUTPATIENT)
Dept: NON INVASIVE DIAGNOSTICS | Age: 57
DRG: 313 | End: 2017-09-07
Payer: COMMERCIAL

## 2017-09-07 VITALS
OXYGEN SATURATION: 96 % | DIASTOLIC BLOOD PRESSURE: 61 MMHG | BODY MASS INDEX: 40.85 KG/M2 | WEIGHT: 222 LBS | HEIGHT: 62 IN | RESPIRATION RATE: 16 BRPM | TEMPERATURE: 98.2 F | SYSTOLIC BLOOD PRESSURE: 106 MMHG | HEART RATE: 67 BPM

## 2017-09-07 LAB
ANION GAP SERPL CALCULATED.3IONS-SCNC: 12 MEQ/L (ref 8–16)
BUN BLDV-MCNC: 16 MG/DL (ref 7–22)
CALCIUM SERPL-MCNC: 9.6 MG/DL (ref 8.5–10.5)
CHLORIDE BLD-SCNC: 101 MEQ/L (ref 98–111)
CHOLESTEROL, FASTING: 200 MG/DL (ref 100–199)
CO2: 23 MEQ/L (ref 23–33)
CREAT SERPL-MCNC: 0.6 MG/DL (ref 0.4–1.2)
EKG ATRIAL RATE: 75 BPM
EKG P AXIS: 29 DEGREES
EKG P-R INTERVAL: 178 MS
EKG Q-T INTERVAL: 394 MS
EKG QRS DURATION: 84 MS
EKG QTC CALCULATION (BAZETT): 439 MS
EKG R AXIS: -11 DEGREES
EKG T AXIS: 12 DEGREES
EKG VENTRICULAR RATE: 75 BPM
GFR SERPL CREATININE-BSD FRML MDRD: > 90 ML/MIN/1.73M2
GLUCOSE BLD-MCNC: 185 MG/DL (ref 70–108)
GLUCOSE BLD-MCNC: 202 MG/DL (ref 70–108)
GLUCOSE BLD-MCNC: 232 MG/DL (ref 70–108)
GLUCOSE BLD-MCNC: 305 MG/DL (ref 70–108)
HCT VFR BLD CALC: 41.7 % (ref 37–47)
HDLC SERPL-MCNC: 46 MG/DL
HEMOGLOBIN: 14.1 GM/DL (ref 12–16)
LDL CHOLESTEROL CALCULATED: 136 MG/DL
LV EF: 63 %
LVEF MODALITY: NORMAL
MCH RBC QN AUTO: 30.3 PG (ref 27–31)
MCHC RBC AUTO-ENTMCNC: 33.8 GM/DL (ref 33–37)
MCV RBC AUTO: 89.8 FL (ref 81–99)
PDW BLD-RTO: 13.3 % (ref 11.5–14.5)
PLATELET # BLD: 259 THOU/MM3 (ref 130–400)
PMV BLD AUTO: 8.1 MCM (ref 7.4–10.4)
POTASSIUM SERPL-SCNC: 4.6 MEQ/L (ref 3.5–5.2)
RBC # BLD: 4.65 MILL/MM3 (ref 4.2–5.4)
SODIUM BLD-SCNC: 136 MEQ/L (ref 135–145)
TRIGLYCERIDE, FASTING: 90 MG/DL (ref 0–199)
WBC # BLD: 9.2 THOU/MM3 (ref 4.8–10.8)

## 2017-09-07 PROCEDURE — 93017 CV STRESS TEST TRACING ONLY: CPT | Performed by: INTERNAL MEDICINE

## 2017-09-07 PROCEDURE — 78452 HT MUSCLE IMAGE SPECT MULT: CPT

## 2017-09-07 PROCEDURE — 80048 BASIC METABOLIC PNL TOTAL CA: CPT

## 2017-09-07 PROCEDURE — 6360000002 HC RX W HCPCS: Performed by: INTERNAL MEDICINE

## 2017-09-07 PROCEDURE — 6360000002 HC RX W HCPCS

## 2017-09-07 PROCEDURE — 80061 LIPID PANEL: CPT

## 2017-09-07 PROCEDURE — 82948 REAGENT STRIP/BLOOD GLUCOSE: CPT

## 2017-09-07 PROCEDURE — 6370000000 HC RX 637 (ALT 250 FOR IP): Performed by: NURSE PRACTITIONER

## 2017-09-07 PROCEDURE — A9500 TC99M SESTAMIBI: HCPCS | Performed by: INTERNAL MEDICINE

## 2017-09-07 PROCEDURE — 3430000000 HC RX DIAGNOSTIC RADIOPHARMACEUTICAL: Performed by: INTERNAL MEDICINE

## 2017-09-07 PROCEDURE — 99239 HOSP IP/OBS DSCHRG MGMT >30: CPT | Performed by: NURSE PRACTITIONER

## 2017-09-07 PROCEDURE — 93306 TTE W/DOPPLER COMPLETE: CPT

## 2017-09-07 PROCEDURE — 2580000003 HC RX 258: Performed by: INTERNAL MEDICINE

## 2017-09-07 PROCEDURE — C9113 INJ PANTOPRAZOLE SODIUM, VIA: HCPCS | Performed by: INTERNAL MEDICINE

## 2017-09-07 PROCEDURE — 36415 COLL VENOUS BLD VENIPUNCTURE: CPT

## 2017-09-07 PROCEDURE — G0378 HOSPITAL OBSERVATION PER HR: HCPCS

## 2017-09-07 PROCEDURE — 85027 COMPLETE CBC AUTOMATED: CPT

## 2017-09-07 RX ADMIN — Medication 10 ML: at 11:19

## 2017-09-07 RX ADMIN — Medication 33.7 MILLICURIE: at 09:12

## 2017-09-07 RX ADMIN — INSULIN LISPRO 4 UNITS: 100 INJECTION, SOLUTION INTRAVENOUS; SUBCUTANEOUS at 11:17

## 2017-09-07 RX ADMIN — LISINOPRIL 20 MG: 20 TABLET ORAL at 11:37

## 2017-09-07 RX ADMIN — SODIUM CHLORIDE: 9 INJECTION, SOLUTION INTRAVENOUS at 02:29

## 2017-09-07 RX ADMIN — METHYLPREDNISOLONE SODIUM SUCCINATE 40 MG: 40 INJECTION, POWDER, FOR SOLUTION INTRAMUSCULAR; INTRAVENOUS at 02:47

## 2017-09-07 RX ADMIN — PANTOPRAZOLE SODIUM 40 MG: 40 INJECTION, POWDER, FOR SOLUTION INTRAVENOUS at 11:15

## 2017-09-07 RX ADMIN — KETOROLAC TROMETHAMINE 15 MG: 30 INJECTION, SOLUTION INTRAMUSCULAR at 11:28

## 2017-09-07 RX ADMIN — ENOXAPARIN SODIUM 40 MG: 40 INJECTION SUBCUTANEOUS at 11:18

## 2017-09-07 RX ADMIN — INSULIN LISPRO 2 UNITS: 100 INJECTION, SOLUTION INTRAVENOUS; SUBCUTANEOUS at 02:20

## 2017-09-07 RX ADMIN — KETOROLAC TROMETHAMINE 15 MG: 30 INJECTION, SOLUTION INTRAMUSCULAR at 01:53

## 2017-09-07 RX ADMIN — METHYLPREDNISOLONE SODIUM SUCCINATE 40 MG: 40 INJECTION, POWDER, FOR SOLUTION INTRAMUSCULAR; INTRAVENOUS at 11:20

## 2017-09-07 RX ADMIN — Medication 11 MILLICURIE: at 07:30

## 2017-09-07 ASSESSMENT — PAIN SCALES - GENERAL
PAINLEVEL_OUTOF10: 0
PAINLEVEL_OUTOF10: 4
PAINLEVEL_OUTOF10: 0
PAINLEVEL_OUTOF10: 0
PAINLEVEL_OUTOF10: 2

## 2017-09-07 NOTE — DISCHARGE SUMMARY
Hospitalist Discharge Summary    Patient: Radha Carvalho  YOB: 1960    MRN: [id number]   Acct: [de-identified]    Primary Care Physician: Joseline Chin. KALIA Reyez    Admit date:  9/5/2017    Discharge date:        Discharge Diagnoses:   1. Chest Pain--resolved; persistent mid/epigastric pain; pleurisy vs. MS; troponin (-)x2, bnp 15.2, d-dimer wnl, CTA of chest: no PE or infiltrates. EGD this afternoon; Solumedrol and toradol; ST today and not suggestive of ischemia; cont Naproxen at home prn  2. Nausea and Vomiting--CT of ab/pelvis: negative acute findings. to have EGD by Dr. Gladys Kumar (9/7)-mild acid reflux. 3. Elevated LFT--resolved, GI on board. Acute hepatitis panel negative. 4. Leukocytosis--resovled, down to 9.2 today from 13.0 on 9/5. Afebrile, CT no infiltrates. U/A on 9/5 negative. Possibly reactive  5. GERD-mild per EGD  6. Hyponatremia--resolved 136, IV NS  7. Essential Hypertension, controlled--continue lisinopril  8. Recent Diagnosed DM--blood glucose 92, 108. Metformin on hold. Carb controlled diet, check A1c in am; may resume metformin on 9/8  9. 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 Morbid obesity--BMI 40.7; weight loss encouraged    Discharge Medications:       Javier Daugherty   Home Medication Instructions SATNAMK:[id number]    Printed on:09/07/17 6500   Medication Information                      citalopram (CELEXA) 20 MG tablet  Take 20 mg by mouth daily. lisinopril (PRINIVIL;ZESTRIL) 20 MG tablet  Take 20 mg by mouth daily. metFORMIN (GLUCOPHAGE) 500 MG tablet  Take 500 mg by mouth 2 times daily (with meals)             naproxen (NAPROSYN) 500 MG tablet  Take 500 mg by mouth 2 times daily as needed for Pain.              omeprazole (PRILOSEC) 10 MG delayed release capsule  Take 10 mg by mouth daily                 Diet:  DIET CARDIAC; Carb Control: 5 carbs/meal (approximate 2000 kcals/day)    Activity:  Up ad hermelindo (Patient can move independently)    Follow-up:  in 1 weeks with Joshua Reyez CNP,  in 2 weeks with Anju Fitzgerald CNP; in 3 weeks with Dr. Robert Jacques    Consultants:  Dr. Pam Blankenship; Dr. Estefania Nation    Procedures:  EGD (9/6)-mild GERD, biopsies taken    Review of systems:  Constitutional: no fever, no chills, no fatigue  Head: no headache, no head injury, no migranes. Lungs: no cough, no shortness of breath, no wheeze  CVS: no palpitation, no chest pain, no shortness of breath  GI: no abdominal pain, no nausea , no vomiting, no constipation  HUA: no dysuria, frequency and urgency  Musculoskeletal: no joint pain, swelling   Hematology: no anemia, no easy brusing or bleeding  Dermatology: no skin rash, no eczema, no prurities,  Psychiatry: no depression, no anxiety  Neurology: no syncope, no seizures, no numbness or tingling of feet, no paresis        Diagnostic Test:  CXR; US GB-normal GB, hepatomegaly; CTA - neg for PE; CT abd/pelvis-no acute findings    CBC:   Recent Labs      09/07/17   0525   WBC  9.2   HGB  14.1   PLT  259     BMP:    Recent Labs      09/07/17   0525   NA  136   K  4.6   CL  101   CO2  23   BUN  16   CREATININE  0.6   GLUCOSE  202*     Calcium:  Recent Labs      09/07/17   0525   CALCIUM  9.6     Ionized Calcium:No results for input(s): IONCA in the last 72 hours. Magnesium:  Recent Labs      09/05/17   0427   MG  2.0     Phosphorus:No results for input(s): PHOS in the last 72 hours. BNP:No results for input(s): BNP in the last 72 hours. Glucose:  Recent Labs      09/07/17   1635   POCGLU  185*     HgbA1C: No results for input(s): LABA1C in the last 72 hours.   INR:   Recent Labs      09/05/17   0752   INR  1.03     Hepatic:   Recent Labs      09/05/17   0427  09/06/17   0539   ALKPHOS  372*  282*   ALT  97*  60   AST  71*  31   PROT  8.0  7.0   BILITOT  0.6  0.9   BILIDIR  <0.2   --    LABALBU  4.5  3.7     Amylase and Lipase:  Recent Labs      09/05/17   1538   LACTA  1.0     Lactic Acid:   Recent Labs 09/05/17   1538   LACTA  1.0     Troponin: No results for input(s): CKTOTAL, CKMB, TROPONINI in the last 72 hours. BNP: No results for input(s): BNP in the last 72 hours. Lipids:   Recent Labs      09/07/17   0525   HDL  46   LDLCALC  136     ABGs: No results found for: PHART, PO2ART, WSG6KAH, BDX0BDY, BEART        PHYSICAL EXAM:  /61  Pulse 67  Temp 98.2 °F (36.8 °C) (Oral)   Resp 16  Ht 5' 2\" (1.575 m)  Wt 222 lb (100.7 kg)  SpO2 96%  BMI 40.6 kg/m2  General:  Awake, alert, not in distress. Appears to be stated age. HEENT: Atraumatic, normocephalic. PERRLA. EOM intact. Pink conjunctiva, anicteric sclera. Pink and moist oral mucosa. Chest: Bilateal air entry, Clear to auscultation, no wheezing, rhonchi or rales. Cardiovascular: RRR, F3V5, no murmur, click, rub or gallop. No lower extremity edema. Pedal and posterior tibialis 2+. Abdomen: Soft, non tender to palpation. Active bowel sounds x 4 quadrants. Musculoskeletal: passive and active ROM x 4 extremities. Integumentary: Pink, warm and dry. Free from rash or lesions. CNS: Oriented to person, place and time. Cranial nerves 2-12 grossly intact. Speech clear. Face symmetrical. No tremor. Weight: Weight: 222 lb (100.7 kg)     24 hour intake/output:    Intake/Output Summary (Last 24 hours) at 09/07/17 1755  Last data filed at 09/07/17 1608   Gross per 24 hour   Intake           1563.6 ml   Output                0 ml   Net           1563.6 ml                 Hospital Course:  Ms. Aubrey Thornton was admitted on 9/5/2017 with complaints of epigastric/chest pain. She had associated N/V and pain increased with inspiration and movement. ACS ruled out with negative troponin and EKG. GI was consulted and she had an EGD on 9/7 which showed mild esophageal reflux. Lexiscan stress test on 9/7 was not suggestive of ischemia. She was treated with Solumedrol and Toradol. Pain resolved. She was discharged home on her current home medications.  She can use home Naproxen as needed for pain. She should follow up with the providers listed above. Plan discussed with Dr. Ronald Jarvis.     Disposition: home    Condition: Stable    Time Spent: 30 to 74 minutes        Electronically signed by Rekha Kilgore CNP on 9/7/2017 at 5:55 PM  Discharging Hospitalist: Dr. Ronald Jarvis

## 2017-09-07 NOTE — PLAN OF CARE
Problem: Discharge Planning:  Goal: Discharged to appropriate level of care  Discharged to appropriate level of care   Outcome: Ongoing  Patient plans to return home at discharge. Patient following plan of care to be discharged at appropriate length of stay. Problem: Tissue Perfusion - Cardiopulmonary, Altered:  Goal: Circulatory function within specified parameters  Circulatory function within specified parameters   Outcome: Ongoing  Pt hemodynamically stable and tolerating PO/IV fluids. BP WNL and NO issues this shift noted. Problem: Pain:  Goal: Pain level will decrease  Pain level will decrease   Outcome: Ongoing  Patient has voiced pain this shift. RN continues to deliver PRN pain medications as ordered. Rn attempts to complete noninvasive and non prescribed methods to reduce pain. Pain frequently reassessed, warm blankets offered, and repositioning in bed for other comfort methods than pain meds attempted. Bed alarm on and fall band applied        Problem: Daily Care:  Goal: Daily care needs are met  Daily care needs are met   Outcome: Ongoing  RN continues to meed pts daily care needs    Comments:   Care plan reviewed with patient. Patient verbalize understanding of the plan of care and contribute to goal setting.

## 2017-09-07 NOTE — PROGRESS NOTES
Pt arrived back from stress test, sitting up in bed. VS- /65, P 71, O2 97, T 97.5, R 20. Alert and oriented to person, place, and time. Speech appropriate and clear. Voices no pain or concerns at this time. Lung sounds clear. Heart sounds regular. Upper extremities pink, warm, and dry, with no numbness or tingling. Hand grasp strong and equal bilaterally. Capillary refill <3 sec. Skin turgor <3 sec. Bowel sounds present in all four quadrants. Lower extremities pink, warm, and dry, with no numbness or tingling. Pedal push and pull strong and equal bilaterally. 0700, 0800, 0900, and 1200 meds were given. IV site right antecubital, patent and running. Blood sugar was 305, 4 units of Humalog was given. Pt sitting up in bed eating lunch. Voices no complaints at this time.

## 2017-09-07 NOTE — CONSULTS
[address] [place] [address]                                 CONSULTATION    PATIENT NAME: Gary Booth                                    :       1960  MED REC NO:   [id number]                                      ROOM:      [id number]  ACCOUNT NO:   [de-identified]                                      ADMISSION  DATE:  2017  PROVIDER:    Nuzhat Burdick. Calvin Yu M.D.    Tk Duque:  2017    REASON FOR CONSULTATION:  Presentation with chest pain. HISTORY OF PRESENT ILLNESS:  This is a 47-year-old female patient, who  presented to the emergency room with chest pain over the last 2 days,  basically starting from Monday night and so she came to the emergency  room and got admitted yesterday. The chest pain was retrosternal,  radiating and lasted 10 to 15 minutes and resolved on its own, 7/10  and the chest pain gets worse with taking a deep breath. If she does  not take a deep breath, she stated that she feels better. No  associated nausea or vomiting, or diarrhea. No dysuria, urgency, or  frequency. However, GI was consulted and she is also having  epigastric pain. GI evaluated, had EKG and that was not revealing,  and recommended Cardiology evaluation; hence, Cardiology was called. Currently, she is still having chest pain on deep breath. REVIEW OF SYSTEMS:  Otherwise, review of systems is negative except as  above-mentioned ones. PAST MEDICAL HISTORY:  Hypertension, gastroesophageal reflux disease,  diabetes, depression and arthritis. PAST SURGICAL HISTORY:  Tubal ligation, surgery for incontinence,  hysterectomy, heel spur surgery and bunionectomy. FAMILY HISTORY:  There is no family history of premature coronary  artery disease in the immediate relatives. SOCIAL HISTORY:  Not a smoker, never smoked. No alcohol. No drug  use. ALLERGIES:  SHE IS ALLERGIC TO ERYTHROMYCIN.     HOME MEDICATIONS:  Prior to admission
Inpatient consult to Cardiology  Consult performed by: Izabella Jean ordered by: Makenzie Mark        Chest pain atypical  [address]    Toradol/ solumderol/  Abdullahi Lechuga  Echo  [id number]    Villa Davis MD
called me and DR Barbara Roberts is able to do EGD today and then pt will follow up with Dr. Yannick Vanessa practice as an outpatient--------------  We are signing off          CT A/P  Impression   No acute intra-abdominal or intrapelvic findings. HISTORY OF PRESENT ILLNESS     patient is sitting in bed. Not very talkative. She has been terminated from our group for non compliance with follow u p(per pt) and so we told her that she would need to f/u with a different GI once discharged. She wants an EGD done today but our schedule is not going to allow this today, but we will plan on tomorrow. She had substernal chest pain and has had nausea and vomiting a couple times since being here. She has been NPO and we were not consulted until after 4 pm last night so I explained to her that we are seeing her this morning as a result. Pain location substernal   Quality sharp  Severity  Not rated on 1-10 scale  Duration intermittent  Radiation none  Timing na  Associated s/s: nausea, vomiting a couple times  Aggravating factors: inspiration  Alleviating factors: not stated    EGD: ? Unknown   She has been terminated and I don't have any recent office notes. Colonoscopy: states it was with DR Smith at some point in the past. She did not follow up          PROBLEM LIST     does not have any pertinent problems on file. PAST MEDICAL HISTORY     has a past medical history of Arthritis; Depression; Diabetes mellitus ([address] GERD (gastroesophageal reflux disease); and Hypertension. PAST SURGICAL HISTORY      has a past surgical history that includes Hysterectomy; Tubal ligation; Bunionectomy; Heel spur surgery; and Incontinence surgery.       LABS:    CBC:   Recent Labs      09/05/17 0427   WBC  13.0*   HGB  15.4   PLT  332     BMP:    Recent Labs      09/05/17 0427   NA  137   K  3.8   CL  95*   CO2  25   BUN  28*   CREATININE  0.8   GLUCOSE  131*     Hepatic:   Recent Labs      09/05/17 0427   ALKPHOS  372*   ALT  97*

## 2017-09-07 NOTE — OP NOTE
body appeared  normal.  Antrum appeared normal.  Pylorus appeared normal.  Duodenum  appears normal.  Scope withdrawn without complications. IMPRESSION:  1. Mild acid reflux. 2.  The finding is very mild, cannot explain the patient's chest  discomfort. 3.  Standard antireflux measure, to start the patient on PPI. 4.  Followup with biopsy in the GI Clinic for evaluation. 5.  The patient should be given high dose PPI, possible combination of  PPI as well as therapy. See if that will help the patient, if not,  needs to also follow up with cardiology service for evaluations. The  patient will follow in the office for management of this patient.         Luis Dumont M.D.      D: 09/06/2017 16:31:06       T: 09/07/2017 2:53:28     [email or web address]  Job#: [id number]     Doc#: [id number]    CC:    Hospitalist Service

## 2017-09-08 NOTE — CARE COORDINATION
9/7/17, 2:40 PM      [facility] day: 2  Location: [facility]/035-A Reason for admit: Epigastric pain [R10.13]  Epigastric pain [R10.13]   Procedure: 9/6 EGD- mild reflux  Treatment Plan of Care: IV protonix for reflux. Echo and stress test today. Pain control. Core Measures: vte  PCP: Robel Dubois CNP  Discharge Plan: Return home alone.
Late entry:   9/8/17, 7:09 AM  Discharge to home alone. Denies needs. Discharge plan discussed by  and . Discharge plan reviewed with patient/ family. Patient/ family verbalize understanding of discharge plan and are in agreement with plan. Understanding was demonstrated using the teach back method.
no

## 2017-09-26 ENCOUNTER — OFFICE VISIT (OUTPATIENT)
Dept: CARDIOLOGY CLINIC | Age: 57
End: 2017-09-26
Payer: COMMERCIAL

## 2017-09-26 VITALS
HEART RATE: 84 BPM | SYSTOLIC BLOOD PRESSURE: 110 MMHG | DIASTOLIC BLOOD PRESSURE: 70 MMHG | BODY MASS INDEX: 39.2 KG/M2 | WEIGHT: 213 LBS | HEIGHT: 62 IN

## 2017-09-26 DIAGNOSIS — R79.89 ELEVATED LFTS: ICD-10-CM

## 2017-09-26 DIAGNOSIS — I10 ESSENTIAL HYPERTENSION: Primary | ICD-10-CM

## 2017-09-26 PROCEDURE — 99214 OFFICE O/P EST MOD 30 MIN: CPT | Performed by: INTERNAL MEDICINE

## 2017-10-05 ENCOUNTER — APPOINTMENT (OUTPATIENT)
Dept: CT IMAGING | Age: 57
End: 2017-10-05
Payer: COMMERCIAL

## 2017-10-05 ENCOUNTER — APPOINTMENT (OUTPATIENT)
Dept: GENERAL RADIOLOGY | Age: 57
End: 2017-10-05
Payer: COMMERCIAL

## 2017-10-05 ENCOUNTER — HOSPITAL ENCOUNTER (EMERGENCY)
Age: 57
Discharge: HOME | End: 2017-10-05
Payer: COMMERCIAL

## 2017-10-05 VITALS
BODY MASS INDEX: 38.09 KG/M2 | WEIGHT: 215 LBS | HEART RATE: 85 BPM | OXYGEN SATURATION: 95 % | TEMPERATURE: 98.3 F | DIASTOLIC BLOOD PRESSURE: 53 MMHG | RESPIRATION RATE: 20 BRPM | SYSTOLIC BLOOD PRESSURE: 104 MMHG | HEIGHT: 63 IN

## 2017-10-05 DIAGNOSIS — R09.1 PLEURISY: Primary | ICD-10-CM

## 2017-10-05 LAB
ALBUMIN SERPL-MCNC: 4.4 G/DL (ref 3.5–5.1)
ALP BLD-CCNC: 336 U/L (ref 38–126)
ALT SERPL-CCNC: 67 U/L (ref 11–66)
AMPHETAMINE+METHAMPHETAMINE URINE SCREEN: NEGATIVE
ANION GAP SERPL CALCULATED.3IONS-SCNC: 15 MEQ/L (ref 8–16)
AST SERPL-CCNC: 60 U/L (ref 5–40)
BARBITURATE QUANTITATIVE URINE: NEGATIVE
BASOPHILS # BLD: 0.3 %
BASOPHILS ABSOLUTE: 0 THOU/MM3 (ref 0–0.1)
BENZODIAZEPINE QUANTITATIVE URINE: NEGATIVE
BILIRUB SERPL-MCNC: 0.7 MG/DL (ref 0.3–1.2)
BILIRUBIN DIRECT: < 0.2 MG/DL (ref 0–0.3)
BUN BLDV-MCNC: 19 MG/DL (ref 7–22)
C-REACTIVE PROTEIN: 3.31 MG/DL (ref 0–1)
CALCIUM SERPL-MCNC: 9.7 MG/DL (ref 8.5–10.5)
CANNABINOID QUANTITATIVE URINE: NEGATIVE
CHLORIDE BLD-SCNC: 96 MEQ/L (ref 98–111)
CO2: 25 MEQ/L (ref 23–33)
COCAINE METABOLITE QUANTITATIVE URINE: NEGATIVE
CREAT SERPL-MCNC: 0.7 MG/DL (ref 0.4–1.2)
D-DIMER QUANTITATIVE: 664 NG/ML FEU (ref 0–500)
EOSINOPHIL # BLD: 5.8 %
EOSINOPHILS ABSOLUTE: 0.8 THOU/MM3 (ref 0–0.4)
GFR SERPL CREATININE-BSD FRML MDRD: 85 ML/MIN/1.73M2
GLUCOSE BLD-MCNC: 109 MG/DL (ref 70–108)
HCT VFR BLD CALC: 41.2 % (ref 37–47)
HEMOGLOBIN: 13.9 GM/DL (ref 12–16)
LIPASE: 30.9 U/L (ref 5.6–51.3)
LYMPHOCYTES # BLD: 18.9 %
LYMPHOCYTES ABSOLUTE: 2.7 THOU/MM3 (ref 1–4.8)
MCH RBC QN AUTO: 29.8 PG (ref 27–31)
MCHC RBC AUTO-ENTMCNC: 33.6 GM/DL (ref 33–37)
MCV RBC AUTO: 88.8 FL (ref 81–99)
MONOCYTES # BLD: 8.1 %
MONOCYTES ABSOLUTE: 1.2 THOU/MM3 (ref 0.4–1.3)
NUCLEATED RED BLOOD CELLS: 0 /100 WBC
OPIATES, URINE: NEGATIVE
OSMOLALITY CALCULATION: 274.8 MOSMOL/KG (ref 275–300)
OXYCODONE: NEGATIVE
PDW BLD-RTO: 13.9 % (ref 11.5–14.5)
PHENCYCLIDINE QUANTITATIVE URINE: NEGATIVE
PLATELET # BLD: 327 THOU/MM3 (ref 130–400)
PMV BLD AUTO: 8.4 MCM (ref 7.4–10.4)
POTASSIUM SERPL-SCNC: 4.3 MEQ/L (ref 3.5–5.2)
PRO-BNP: 132.6 PG/ML (ref 0–900)
RBC # BLD: 4.64 MILL/MM3 (ref 4.2–5.4)
RBC # BLD: NORMAL 10*6/UL
SEG NEUTROPHILS: 66.9 %
SEGMENTED NEUTROPHILS ABSOLUTE COUNT: 9.7 THOU/MM3 (ref 1.8–7.7)
SODIUM BLD-SCNC: 136 MEQ/L (ref 135–145)
TOTAL PROTEIN: 8 G/DL (ref 6.1–8)
TROPONIN T: < 0.01 NG/ML
WBC # BLD: 14.5 THOU/MM3 (ref 4.8–10.8)

## 2017-10-05 PROCEDURE — 85025 COMPLETE CBC W/AUTO DIFF WBC: CPT

## 2017-10-05 PROCEDURE — 36415 COLL VENOUS BLD VENIPUNCTURE: CPT

## 2017-10-05 PROCEDURE — 6360000002 HC RX W HCPCS: Performed by: PHYSICIAN ASSISTANT

## 2017-10-05 PROCEDURE — 80307 DRUG TEST PRSMV CHEM ANLYZR: CPT

## 2017-10-05 PROCEDURE — 6370000000 HC RX 637 (ALT 250 FOR IP): Performed by: PHYSICIAN ASSISTANT

## 2017-10-05 PROCEDURE — 80053 COMPREHEN METABOLIC PANEL: CPT

## 2017-10-05 PROCEDURE — 83880 ASSAY OF NATRIURETIC PEPTIDE: CPT

## 2017-10-05 PROCEDURE — 84484 ASSAY OF TROPONIN QUANT: CPT

## 2017-10-05 PROCEDURE — 71275 CT ANGIOGRAPHY CHEST: CPT

## 2017-10-05 PROCEDURE — 99285 EMERGENCY DEPT VISIT HI MDM: CPT

## 2017-10-05 PROCEDURE — 86140 C-REACTIVE PROTEIN: CPT

## 2017-10-05 PROCEDURE — 85379 FIBRIN DEGRADATION QUANT: CPT

## 2017-10-05 PROCEDURE — 82248 BILIRUBIN DIRECT: CPT

## 2017-10-05 PROCEDURE — 83690 ASSAY OF LIPASE: CPT

## 2017-10-05 PROCEDURE — 6360000004 HC RX CONTRAST MEDICATION: Performed by: PHYSICIAN ASSISTANT

## 2017-10-05 PROCEDURE — 96374 THER/PROPH/DIAG INJ IV PUSH: CPT

## 2017-10-05 PROCEDURE — 71020 XR CHEST STANDARD TWO VW: CPT

## 2017-10-05 PROCEDURE — 93005 ELECTROCARDIOGRAM TRACING: CPT | Performed by: PHYSICIAN ASSISTANT

## 2017-10-05 RX ORDER — SUCRALFATE ORAL 1 G/10ML
1 SUSPENSION ORAL 4 TIMES DAILY
Qty: 240 ML | Refills: 0 | Status: SHIPPED | OUTPATIENT
Start: 2017-10-05 | End: 2018-07-25 | Stop reason: ALTCHOICE

## 2017-10-05 RX ORDER — NAPROXEN 500 MG/1
500 TABLET ORAL 2 TIMES DAILY
Qty: 60 TABLET | Refills: 0 | Status: SHIPPED | OUTPATIENT
Start: 2017-10-05 | End: 2018-07-25 | Stop reason: ALTCHOICE

## 2017-10-05 RX ORDER — KETOROLAC TROMETHAMINE 30 MG/ML
30 INJECTION, SOLUTION INTRAMUSCULAR; INTRAVENOUS ONCE
Status: COMPLETED | OUTPATIENT
Start: 2017-10-05 | End: 2017-10-05

## 2017-10-05 RX ADMIN — KETOROLAC TROMETHAMINE 30 MG: 30 INJECTION, SOLUTION INTRAMUSCULAR at 21:22

## 2017-10-05 RX ADMIN — IOPAMIDOL 80 ML: 755 INJECTION, SOLUTION INTRAVENOUS at 21:10

## 2017-10-05 RX ADMIN — Medication 15 ML: at 21:22

## 2017-10-05 ASSESSMENT — PAIN DESCRIPTION - PROGRESSION
CLINICAL_PROGRESSION: GRADUALLY IMPROVING
CLINICAL_PROGRESSION: NOT CHANGED
CLINICAL_PROGRESSION: NOT CHANGED

## 2017-10-05 ASSESSMENT — PAIN DESCRIPTION - FREQUENCY
FREQUENCY: CONTINUOUS

## 2017-10-05 ASSESSMENT — PAIN DESCRIPTION - ORIENTATION
ORIENTATION: MID
ORIENTATION: MID
ORIENTATION: LEFT;MID

## 2017-10-05 ASSESSMENT — ENCOUNTER SYMPTOMS
NAUSEA: 0
DIARRHEA: 0
CONSTIPATION: 0
COUGH: 0
VOMITING: 0
SHORTNESS OF BREATH: 0

## 2017-10-05 ASSESSMENT — PAIN DESCRIPTION - LOCATION
LOCATION: CHEST

## 2017-10-05 ASSESSMENT — PAIN DESCRIPTION - DESCRIPTORS
DESCRIPTORS: SHARP
DESCRIPTORS: ACHING
DESCRIPTORS: SHARP

## 2017-10-05 ASSESSMENT — PAIN DESCRIPTION - PAIN TYPE
TYPE: ACUTE PAIN

## 2017-10-05 ASSESSMENT — PAIN DESCRIPTION - ONSET
ONSET: ON-GOING

## 2017-10-05 ASSESSMENT — PAIN SCALES - GENERAL
PAINLEVEL_OUTOF10: 9
PAINLEVEL_OUTOF10: 6
PAINLEVEL_OUTOF10: 9

## 2017-10-05 NOTE — ED AVS SNAPSHOT
After Visit Summary  (Discharge Instructions)    Medication List for Home    Based on the information you provided to us as well as any changes during this visit, the following is your updated medication list.  Compare this with your prescription bottles at home. If you have any questions or concerns, contact your primary care physician's office. Daily Medication List (This medication list can be shared with any Healthcare provider who is helping you manage your medications)      There are NEW medications for you. START taking them after you leave the hospital     naproxen 500 MG tablet   Commonly known as:  NAPROSYN   Take 1 tablet by mouth 2 times daily Do not take with ibuprofen, advil, motrin, or aleve. sucralfate 1 GM/10ML suspension   Commonly known as:  CARAFATE   Take 10 mLs by mouth 4 times daily         These are medications you told us you were taking at home, CONTINUE taking them after you leave the hospital     omeprazole 10 MG delayed release capsule   Commonly known as:  PRILOSEC   Take 40 mg by mouth daily         ASK your doctor about these medications if you have questions     citalopram 20 MG tablet   Commonly known as:  CELEXA   Take 20 mg by mouth daily. lisinopril 20 MG tablet   Commonly known as:  PRINIVIL;ZESTRIL   Take 20 mg by mouth daily. metFORMIN 500 MG tablet   Commonly known as:  GLUCOPHAGE   Take 500 mg by mouth 2 times daily (with meals)       ZORVOLEX 35 MG Caps   Generic drug:  Diclofenac   Take 35 mg by mouth as needed            Where to Get Your Medications      You can get these medications from any pharmacy     Bring a paper prescription for each of these medications     naproxen 500 MG tablet    sucralfate 1 GM/10ML suspension               Allergies as of 10/5/2017        Reactions    Erythromycin       Immunizations as of 10/5/2017  Reviewed on 10/5/2017    No immunizations on file.          After Visit Summary This summary was created for you. Thank you for entrusting your care to us. The following information includes details about your hospital/visit stay along with steps you should take to help with your recovery once you leave the hospital.  In this packet, you will find information about the topics listed below:    · Instructions about your medications including a list of your home medications  · A summary of your hospital visit  · Follow-up appointments once you have left the hospital  · Your care plan at home      You may receive a survey regarding the care you received during your stay. Your input is valuable to us. We encourage you to complete and return your survey in the envelope provided. We hope you will choose us in the future for your healthcare needs. Patient Information     Patient Name LUZMA Frank 1960      Care Provided at:     Name Address Phone       [address] [phone]            Your Visit    Here you will find information about your visit, including the reason for your visit. Please take this sheet with you when you visit your doctor or other health care provider in the future. It will help determine the best possible medical care for you at that time. If you have any questions once you leave the hospital, please call the department phone number listed below. Diagnoses this visit     Your diagnosis was PLEURISY. Visit Information     Date of Visit Department Dept Phone    10/5/2017 [facility] EMERGENCY DEPT [phone]      You were seen by     You were seen by Wilmar Bright PA-C. Follow-up Appointments    Below is a list of your follow-up and future appointments. This may not be a complete list as you may have made appointments directly with providers that we are not aware of or your providers may have made some for you. Please call your providers to confirm appointments. It is important to keep your appointments. Please bring your current insurance card, photo ID, co-pay, and all medication bottles to your appointment. If self-pay, payment is expected at the time of service. Follow-up Information     Follow up with Valentín Reyez CNP. Call in 1 day. Specialty:  Nurse Practitioner    Contact information:    [address] [id number] [phone]          Follow up with [address] [place] [address] [address] [address] EMERGENCY DEPT. Specialty:  Emergency Medicine    Why:  If symptoms worsen    Contact information:    [facility] [address] [place] [address],[address]      Preventive Care        Date Due    Diabetic Foot Exam 1/24/1968    Hemoglobin A1C (Test For Long-Term Glucose Control) 1/24/1968    Eye Exam By An Eye Doctor 1/24/1968    HIV screening is recommended for all people regardless of risk factors  aged 15-65 years at least once (lifetime) who have never been HIV tested. 1/24/1973    Urine Check For Kidney Problems 1/24/1976    Tetanus Combination Vaccine (1 - Tdap) 1/24/1977    Pneumococcal Vaccine - Pneumovax for adults aged 19-64 years with: chronic heart disease, chronic lung disease, diabetes mellitus, alcoholism, chronic liver disease, or cigarette smoking. (1 of 1 - PPSV23) 1/24/1977    Pap Smear 1/24/1979    Colonoscopy 1/24/2008    Yearly Flu Vaccine (1) 9/1/2017    Cholesterol Screening 9/7/2018    Mammograms are recommended every 2 years for low/average risk patients aged 48 - 69, and every year for high risk patients per updated national guidelines. However these guidelines can be individualized by your provider. 8/16/2019                 Care Plan Once You Return Home    This section includes instructions you will need to follow once you leave the hospital.  Your care team will discuss these with you, so you and those caring for you know how to best care for your health needs at home. 7. Enter your Password Reset Question and Answer. This can be used at a later time if you forget your password. 8. Enter your e-mail address. You will receive e-mail notification when new information is available in [address] 9. Click Sign Up. You can now view your medical record. Additional Information  If you have questions, please contact the physician practice where you receive care. Remember, MyChart is NOT to be used for urgent needs. For medical emergencies, dial 911. For questions regarding your MyChart account call [phone]. If you have a clinical question, please call your doctor's office. View your information online  ? Review your current list of  medications, immunization, and allergies. ? Review your future test results online . ? Review your discharge instructions provided by your caregivers at discharge    Certain functionality such as prescription refills, scheduling appointments or sending messages to your provider are not activated if your provider does not use [employer] in his/her office    For questions regarding your MyChart account call [phone]. If you have a clinical question, please call your doctor's office. The information on all pages of the After Visit Summary has been reviewed with me, the patient and/or responsible adult, by my health care provider(s). I had the opportunity to ask questions regarding this information. I understand I should dispose of my armband safely at home to protect my health information. A complete copy of the After Visit Summary has been given to me, the patient and/or responsible adult.          Patient Signature/Responsible Adult: ___________________________________    Nurse Signature: ___________________________________  Resident/MLP Signature: ___________________________________  Attending Signature: ___________________________________    Date:____________Time:____________              Discharge Instructions Pleurisy: Care Instructions  Your Care Instructions  Pleurisy is inflammation of the tissue that lines the inside of the chest and covers the lungs (pleura). Pleurisy is often caused by an infection, usually a virus. It also can be caused by other health problems, such as pneumonia or lupus. Pleurisy can cause sharp chest pain that gets worse when you cough or take a deep breath. You may need more tests to find out what is causing your pleurisy. Treatment depends on the cause. Pleurisy may come and go for a few days, or it may continue if the cause has not been treated. Home treatment can help ease symptoms. Follow-up care is a key part of your treatment and safety. Be sure to make and go to all appointments, and call your doctor if you are having problems. It's also a good idea to know your test results and keep a list of the medicines you take. How can you care for yourself at home? · Take an over-the-counter pain medicine, such as acetaminophen (Tylenol), ibuprofen (Advil, Motrin), or naproxen (Aleve). Read and follow all instructions on the label. · Do not take two or more pain medicines at the same time unless the doctor told you to. Many pain medicines have acetaminophen, which is Tylenol. Too much acetaminophen (Tylenol) can be harmful. · If your doctor prescribed antibiotics, take them as directed. Do not stop taking them just because you feel better. You need to take the full course of antibiotics. · Take cough medicine as directed if your doctor recommends it. · Avoid activities that make the pain worse. When should you call for help? Call 911 anytime you think you may need emergency care. For example, call if:  · You have severe trouble breathing. · You have severe chest pain. · You passed out (lost consciousness). Call your doctor now or seek immediate medical care if:  · You have difficulty breathing. · You have a new or higher fever. · You cough up blood. Watch closely for changes in your health, and be sure to contact your doctor if:  · You begin to cough up mucus more deeply or more often. · You are not getting better as expected. Where can you learn more? Go to https://[employer]. org and sign in to your [employer] account. Enter F346 in the [place] box to learn more about \"Pleurisy: Care Instructions. \"     If you do not have an account, please click on the \"Sign Up Now\" link. Current as of: March 25, 2017  Content Version: 11.3  © 7514-4162 [employer]. Care instructions adapted under license by [facility] ([facility]). If you have questions about a medical condition or this instruction, always ask your healthcare professional. [address] any warranty or liability for your use of this information.

## 2017-10-05 NOTE — LETTER
[facility] EMERGENCY [facility]  [address] [facility]  [place] [address]  Phone: [phone]             October 5, 2017    Patient: Vibha Auguste   YOB: 1960   Date of Visit: 10/5/2017       To Whom It May Concern: Vibha Auguste was seen and treated in our emergency department on 10/5/2017. She may return to work on 10-7-17.       Sincerely,         Signature:__________________________________

## 2017-10-05 NOTE — ED TRIAGE NOTES
Patient presents to ED with complaint of mid to left sided chest pain. Per patient this started on Tuesday in mid afternoon. 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 Patient states that it is worse with breathing, coughing and sneezing and better with rest. Patient states that she has a history of pleurisy and this feels the same but lower. Denies nausea, vomiting, cold sweat, dizziness, loss of consciousness, changes in bowel or bladder habits, fever, swelling in legs, weakness, numbness, tingling.

## 2017-10-06 NOTE — ED NOTES
Patient resting with eyes closed. No needs voiced at this time. Pain improving as noted. Vitals as noted. Alert and oriented with easy respirations. No needs voiced at this time. Updated on plan of care. Side rails up x2. Call light in reach.         Georgie Sewell RN  10/05/17 0039

## 2017-10-06 NOTE — ED PROVIDER NOTES
3.31 (*)     All other components within normal limits   D-DIMER, QUANTITATIVE - Abnormal; Notable for the following:     D-Dimer, Quant 664.00 (*)     All other components within normal limits   HEPATIC FUNCTION PANEL - Abnormal; Notable for the following:     Alkaline Phosphatase 336 (*)     AST 60 (*)     ALT 67 (*)     All other components within normal limits   OSMOLALITY - Abnormal; Notable for the following:     Osmolality Calc 274.8 (*)     All other components within normal limits   GLOMERULAR FILTRATION RATE, ESTIMATED - Abnormal; Notable for the following:     Est, Glom Filt Rate 85 (*)     All other components within normal limits   TROPONIN   URINE DRUG SCREEN   LIPASE   BRAIN NATRIURETIC PEPTIDE   ANION GAP       EMERGENCY DEPARTMENT COURSE:   Vitals:    Vitals:    10/05/17 2050 10/05/17 2125 10/05/17 2126 10/05/17 2240   BP: 124/63 (!) 125/55  (!) 104/53   Pulse: 93 83 83 85   Resp: 20 20 20 20   Temp:   98.3 °F (36.8 °C)    TempSrc:       SpO2: 99% 96%  95%   Weight:       Height:         Patient presents for complaints of chest pain. Is been going on for the past 2 days as she describes it as similar to pain she had in the past and she was diagnosed with pleurisy. She very recently had a normal stress test and echocardiogram as well as a negative CTA of the chest.  However, d-dimer today is elevated and CTA was repeated, no acute findings. Laboratory results reassuring. She was treated in the department with GI cocktail and Toradol and does have improvement with these medications. Due to the duration of her symptoms lasting 2 days with no change in the pain, she is declining saying for repeat troponin. She feels comfortable with discharge and outpatient follow-up. She is educated about reasons for which to return and otherwise denies any complaints at this time. CRITICAL CARE:   none    CONSULTS:  none    PROCEDURES:  None    FINAL IMPRESSION      1.  Pleurisy          DISPOSITION/PLAN

## 2017-10-07 LAB
EKG ATRIAL RATE: 92 BPM
EKG P AXIS: 37 DEGREES
EKG P-R INTERVAL: 164 MS
EKG Q-T INTERVAL: 348 MS
EKG QRS DURATION: 80 MS
EKG QTC CALCULATION (BAZETT): 430 MS
EKG R AXIS: -16 DEGREES
EKG T AXIS: 26 DEGREES
EKG VENTRICULAR RATE: 92 BPM

## 2017-10-14 ASSESSMENT — ENCOUNTER SYMPTOMS
BACK PAIN: 0
PHOTOPHOBIA: 0
SORE THROAT: 0
TROUBLE SWALLOWING: 0
COLOR CHANGE: 0

## 2018-07-27 ENCOUNTER — HOSPITAL ENCOUNTER (OUTPATIENT)
Age: 58
Discharge: HOME | End: 2018-07-27
Payer: COMMERCIAL

## 2018-07-27 ENCOUNTER — HOSPITAL ENCOUNTER (OUTPATIENT)
Age: 58
End: 2018-07-27
Payer: COMMERCIAL

## 2018-07-27 DIAGNOSIS — R79.89 ELEVATED LFTS: ICD-10-CM

## 2018-07-27 DIAGNOSIS — Z12.11 ENCOUNTER FOR COLONOSCOPY DUE TO HISTORY OF COLONIC POLYP: ICD-10-CM

## 2018-07-27 DIAGNOSIS — R63.4 WEIGHT LOSS, NON-INTENTIONAL: ICD-10-CM

## 2018-07-27 DIAGNOSIS — Z86.010 ENCOUNTER FOR COLONOSCOPY DUE TO HISTORY OF COLONIC POLYP: ICD-10-CM

## 2018-07-27 DIAGNOSIS — K76.9 LESION OF RIGHT LOBE OF LIVER: ICD-10-CM

## 2018-07-27 LAB
AFP-TUMOR MARKER: 2.9 UG/L
ALBUMIN SERPL-MCNC: 4.5 G/DL (ref 3.5–5.1)
ALP BLD-CCNC: 499 U/L (ref 38–126)
ALT SERPL-CCNC: 80 U/L (ref 11–66)
AST SERPL-CCNC: 79 U/L (ref 5–40)
BILIRUB SERPL-MCNC: 0.6 MG/DL (ref 0.3–1.2)
BILIRUBIN DIRECT: < 0.2 MG/DL (ref 0–0.3)
CEA: 1 NG/ML (ref 0–5)
GAMMA GLUTAMYL TRANSFERASE: 1118 U/L (ref 8–69)
INR BLD: 0.97 (ref 0.85–1.13)
IRON: 82 UG/DL (ref 50–170)
TOTAL PROTEIN: 8.3 G/DL (ref 6.1–8)

## 2018-07-27 PROCEDURE — 36415 COLL VENOUS BLD VENIPUNCTURE: CPT

## 2018-07-27 PROCEDURE — 86039 ANTINUCLEAR ANTIBODIES (ANA): CPT

## 2018-07-27 PROCEDURE — 85610 PROTHROMBIN TIME: CPT

## 2018-07-27 PROCEDURE — 82105 ALPHA-FETOPROTEIN SERUM: CPT

## 2018-07-27 PROCEDURE — 83540 ASSAY OF IRON: CPT

## 2018-07-27 PROCEDURE — 80076 HEPATIC FUNCTION PANEL: CPT

## 2018-07-27 PROCEDURE — 82103 ALPHA-1-ANTITRYPSIN TOTAL: CPT

## 2018-07-27 PROCEDURE — 83516 IMMUNOASSAY NONANTIBODY: CPT

## 2018-07-27 PROCEDURE — 82977 ASSAY OF GGT: CPT

## 2018-07-27 PROCEDURE — 82390 ASSAY OF CERULOPLASMIN: CPT

## 2018-07-27 PROCEDURE — 82378 CARCINOEMBRYONIC ANTIGEN: CPT

## 2018-07-27 PROCEDURE — 86038 ANTINUCLEAR ANTIBODIES: CPT

## 2018-07-28 LAB
ALPHA-1 ANTITRYPSIN: 172 MG/DL (ref 90–200)
CERULOPLASMIN: 44 MG/DL (ref 17–54)

## 2018-07-29 LAB
ANA SCREEN: DETECTED
F-ACTIN AB IGG: 13 UNITS (ref 0–19)
MITOCHONDRIAL ANTIBODY: 41.1 UNITS (ref 0–20)

## 2018-07-30 LAB — ANA SCREEN, REFLEXED: > 1

## 2018-08-20 ENCOUNTER — HOSPITAL ENCOUNTER (OUTPATIENT)
Dept: MAMMOGRAPHY | Age: 58
Discharge: HOME | End: 2018-08-25
Payer: COMMERCIAL

## 2018-08-20 DIAGNOSIS — Z12.31 VISIT FOR SCREENING MAMMOGRAM: ICD-10-CM

## 2018-08-20 PROCEDURE — 77063 BREAST TOMOSYNTHESIS BI: CPT

## 2018-08-24 ENCOUNTER — HOSPITAL ENCOUNTER (OUTPATIENT)
Age: 58
Discharge: HOME | End: 2018-08-24
Payer: COMMERCIAL

## 2018-08-24 DIAGNOSIS — Z80.7 FAMILY HISTORY OF LYMPHOMA: ICD-10-CM

## 2018-08-24 DIAGNOSIS — R93.2 ABNORMAL LIVER DIAGNOSTIC IMAGING: ICD-10-CM

## 2018-08-24 LAB
ERYTHROCYTE [DISTWIDTH] IN BLOOD BY AUTOMATED COUNT: 13.9 % (ref 11.5–14.5)
ERYTHROCYTE [DISTWIDTH] IN BLOOD BY AUTOMATED COUNT: 45.7 FL (ref 35–45)
HCT VFR BLD CALC: 39.1 % (ref 37–47)
HEMOGLOBIN: 12.8 GM/DL (ref 12–16)
MCH RBC QN AUTO: 29.9 PG (ref 26–33)
MCHC RBC AUTO-ENTMCNC: 32.7 GM/DL (ref 32.2–35.5)
MCV RBC AUTO: 91.4 FL (ref 81–99)
PLATELET # BLD: 240 THOU/MM3 (ref 130–400)
PMV BLD AUTO: 10 FL (ref 9.4–12.4)
RBC # BLD: 4.28 MILL/MM3 (ref 4.2–5.4)
WBC # BLD: 7.4 THOU/MM3 (ref 4.8–10.8)

## 2018-08-24 PROCEDURE — 85027 COMPLETE CBC AUTOMATED: CPT

## 2018-08-24 PROCEDURE — 36415 COLL VENOUS BLD VENIPUNCTURE: CPT

## 2018-10-10 ENCOUNTER — HOSPITAL ENCOUNTER (OUTPATIENT)
Age: 58
Discharge: HOME | End: 2018-10-10
Payer: COMMERCIAL

## 2018-10-10 DIAGNOSIS — K74.3 PRIMARY BILIARY CHOLANGITIS (HCC): ICD-10-CM

## 2018-10-10 DIAGNOSIS — R74.8 ELEVATED SERUM GGT LEVEL: ICD-10-CM

## 2018-10-10 DIAGNOSIS — R79.89 ELEVATED LFTS: ICD-10-CM

## 2018-10-10 DIAGNOSIS — R74.8 ELEVATED ALKALINE PHOSPHATASE LEVEL: ICD-10-CM

## 2018-10-10 DIAGNOSIS — K75.4 AUTOIMMUNE HEPATITIS (HCC): ICD-10-CM

## 2018-10-10 LAB
ALBUMIN SERPL-MCNC: 4.4 G/DL (ref 3.5–5.1)
ALP BLD-CCNC: 416 U/L (ref 38–126)
ALT SERPL-CCNC: 59 U/L (ref 11–66)
AST SERPL-CCNC: 72 U/L (ref 5–40)
BILIRUB SERPL-MCNC: 0.4 MG/DL (ref 0.3–1.2)
BILIRUBIN DIRECT: < 0.2 MG/DL (ref 0–0.3)
GAMMA GLUTAMYL TRANSFERASE: 797 U/L (ref 8–69)
GLOBULIN: 3.1 GM/DL (ref 2.1–3.4)
HBV SURFACE AB TITR SER: POSITIVE {TITER}
HEPATITIS B SURFACE ANTIGEN: NEGATIVE
TOTAL PROTEIN: 7.5 G/DL (ref 6.1–8)

## 2018-10-10 PROCEDURE — 86376 MICROSOMAL ANTIBODY EACH: CPT

## 2018-10-10 PROCEDURE — 83605 ASSAY OF LACTIC ACID: CPT

## 2018-10-10 PROCEDURE — 84210 ASSAY OF PYRUVATE: CPT

## 2018-10-10 PROCEDURE — 82977 ASSAY OF GGT: CPT

## 2018-10-10 PROCEDURE — 86706 HEP B SURFACE ANTIBODY: CPT

## 2018-10-10 PROCEDURE — 80076 HEPATIC FUNCTION PANEL: CPT

## 2018-10-10 PROCEDURE — 36415 COLL VENOUS BLD VENIPUNCTURE: CPT

## 2018-10-10 PROCEDURE — 82784 ASSAY IGA/IGD/IGG/IGM EACH: CPT

## 2018-10-10 PROCEDURE — 87340 HEPATITIS B SURFACE AG IA: CPT

## 2018-10-11 LAB — IGM: 198 MG/DL (ref 40–230)

## 2018-10-13 LAB
LIVER-KIDNEY MICROSOMAL AB IGG: NORMAL
MISC. #1 REFERENCE GROUP TEST: NORMAL

## 2018-11-01 ENCOUNTER — HOSPITAL ENCOUNTER (OUTPATIENT)
Age: 58
Discharge: HOME | End: 2018-11-01
Payer: COMMERCIAL

## 2018-11-01 DIAGNOSIS — R79.89 ELEVATED LFTS: ICD-10-CM

## 2018-11-01 DIAGNOSIS — K75.4 AUTOIMMUNE HEPATITIS (HCC): ICD-10-CM

## 2018-11-01 LAB
ALBUMIN SERPL-MCNC: 4.2 G/DL (ref 3.5–5.1)
ALP BLD-CCNC: 462 U/L (ref 38–126)
ALT SERPL-CCNC: 55 U/L (ref 11–66)
ANION GAP SERPL CALCULATED.3IONS-SCNC: 13 MEQ/L (ref 8–16)
AST SERPL-CCNC: 60 U/L (ref 5–40)
BILIRUB SERPL-MCNC: 0.6 MG/DL (ref 0.3–1.2)
BILIRUBIN DIRECT: < 0.2 MG/DL (ref 0–0.3)
BUN BLDV-MCNC: 17 MG/DL (ref 7–22)
CALCIUM SERPL-MCNC: 9.7 MG/DL (ref 8.5–10.5)
CHLORIDE BLD-SCNC: 99 MEQ/L (ref 98–111)
CO2: 27 MEQ/L (ref 23–33)
CREAT SERPL-MCNC: 0.7 MG/DL (ref 0.4–1.2)
ERYTHROCYTE [DISTWIDTH] IN BLOOD BY AUTOMATED COUNT: 13.3 % (ref 11.5–14.5)
ERYTHROCYTE [DISTWIDTH] IN BLOOD BY AUTOMATED COUNT: 45.3 FL (ref 35–45)
GFR SERPL CREATININE-BSD FRML MDRD: 85 ML/MIN/1.73M2
GLUCOSE BLD-MCNC: 88 MG/DL (ref 70–108)
HCT VFR BLD CALC: 42.7 % (ref 37–47)
HEMOGLOBIN: 13.6 GM/DL (ref 12–16)
MCH RBC QN AUTO: 29.8 PG (ref 26–33)
MCHC RBC AUTO-ENTMCNC: 31.9 GM/DL (ref 32.2–35.5)
MCV RBC AUTO: 93.4 FL (ref 81–99)
PLATELET # BLD: 311 THOU/MM3 (ref 130–400)
PMV BLD AUTO: 10 FL (ref 9.4–12.4)
POTASSIUM SERPL-SCNC: 4.6 MEQ/L (ref 3.5–5.2)
RBC # BLD: 4.57 MILL/MM3 (ref 4.2–5.4)
SODIUM BLD-SCNC: 139 MEQ/L (ref 135–145)
TOTAL PROTEIN: 7.6 G/DL (ref 6.1–8)
WBC # BLD: 8.3 THOU/MM3 (ref 4.8–10.8)

## 2018-11-01 PROCEDURE — 80053 COMPREHEN METABOLIC PANEL: CPT

## 2018-11-01 PROCEDURE — 82248 BILIRUBIN DIRECT: CPT

## 2018-11-01 PROCEDURE — 85027 COMPLETE CBC AUTOMATED: CPT

## 2018-11-01 PROCEDURE — 36415 COLL VENOUS BLD VENIPUNCTURE: CPT

## 2018-12-31 ENCOUNTER — HOSPITAL ENCOUNTER (OUTPATIENT)
Age: 58
Discharge: HOME | End: 2018-12-31
Payer: COMMERCIAL

## 2018-12-31 DIAGNOSIS — R74.8 ELEVATED SERUM GGT LEVEL: ICD-10-CM

## 2018-12-31 DIAGNOSIS — K75.4 AUTOIMMUNE HEPATITIS (HCC): ICD-10-CM

## 2018-12-31 DIAGNOSIS — R79.89 ELEVATED LFTS: ICD-10-CM

## 2018-12-31 LAB
ALBUMIN SERPL-MCNC: 4.1 G/DL (ref 3.5–5.1)
ALP BLD-CCNC: 452 U/L (ref 38–126)
ALT SERPL-CCNC: 70 U/L (ref 11–66)
AMMONIA: 35 UMOL/L (ref 11–60)
AST SERPL-CCNC: 57 U/L (ref 5–40)
BILIRUB SERPL-MCNC: 0.6 MG/DL (ref 0.3–1.2)
BILIRUBIN DIRECT: < 0.2 MG/DL (ref 0–0.3)
FERRITIN: 288 NG/ML (ref 10–291)
GAMMA GLUTAMYL TRANSFERASE: 911 U/L (ref 8–69)
INR BLD: 0.93 (ref 0.85–1.13)
TOTAL PROTEIN: 7.7 G/DL (ref 6.1–8)

## 2018-12-31 PROCEDURE — 36415 COLL VENOUS BLD VENIPUNCTURE: CPT

## 2018-12-31 PROCEDURE — 82140 ASSAY OF AMMONIA: CPT

## 2018-12-31 PROCEDURE — 82728 ASSAY OF FERRITIN: CPT

## 2018-12-31 PROCEDURE — 83516 IMMUNOASSAY NONANTIBODY: CPT

## 2018-12-31 PROCEDURE — 85610 PROTHROMBIN TIME: CPT

## 2018-12-31 PROCEDURE — 80076 HEPATIC FUNCTION PANEL: CPT

## 2018-12-31 PROCEDURE — 82977 ASSAY OF GGT: CPT

## 2019-01-02 LAB — F-ACTIN AB IGG: 13 UNITS (ref 0–19)

## 2019-01-18 ENCOUNTER — HOSPITAL ENCOUNTER (OUTPATIENT)
Age: 59
Discharge: HOME | End: 2019-01-18
Payer: COMMERCIAL

## 2019-01-18 DIAGNOSIS — R74.8 ELEVATED SERUM GGT LEVEL: ICD-10-CM

## 2019-01-18 DIAGNOSIS — K75.4 AUTOIMMUNE HEPATITIS (HCC): ICD-10-CM

## 2019-01-18 DIAGNOSIS — R79.89 ELEVATED LFTS: ICD-10-CM

## 2019-01-18 DIAGNOSIS — Z80.7 FAMILY HISTORY OF LYMPHOMA: ICD-10-CM

## 2019-01-18 DIAGNOSIS — Z79.899 LONG TERM USE OF DRUG: ICD-10-CM

## 2019-01-18 LAB
ALBUMIN SERPL-MCNC: 4.3 G/DL (ref 3.5–5.1)
ALP BLD-CCNC: 417 U/L (ref 38–126)
ALT SERPL-CCNC: 64 U/L (ref 11–66)
AMMONIA: 31 UMOL/L (ref 11–60)
ANION GAP SERPL CALCULATED.3IONS-SCNC: 13 MEQ/L (ref 8–16)
AST SERPL-CCNC: 93 U/L (ref 5–40)
BASOPHILS # BLD: 0.6 %
BASOPHILS ABSOLUTE: 0.1 THOU/MM3 (ref 0–0.1)
BILIRUB SERPL-MCNC: 0.7 MG/DL (ref 0.3–1.2)
BILIRUBIN DIRECT: < 0.2 MG/DL (ref 0–0.3)
BUN BLDV-MCNC: 14 MG/DL (ref 7–22)
CALCIUM SERPL-MCNC: 9.7 MG/DL (ref 8.5–10.5)
CHLORIDE BLD-SCNC: 100 MEQ/L (ref 98–111)
CO2: 28 MEQ/L (ref 23–33)
CREAT SERPL-MCNC: 0.7 MG/DL (ref 0.4–1.2)
EOSINOPHIL # BLD: 8.7 %
EOSINOPHILS ABSOLUTE: 0.9 THOU/MM3 (ref 0–0.4)
ERYTHROCYTE [DISTWIDTH] IN BLOOD BY AUTOMATED COUNT: 14.1 % (ref 11.5–14.5)
ERYTHROCYTE [DISTWIDTH] IN BLOOD BY AUTOMATED COUNT: 46.9 FL (ref 35–45)
GAMMA GLUTAMYL TRANSFERASE: 840 U/L (ref 8–69)
GFR SERPL CREATININE-BSD FRML MDRD: 85 ML/MIN/1.73M2
GLUCOSE BLD-MCNC: 90 MG/DL (ref 70–108)
HCT VFR BLD CALC: 41.2 % (ref 37–47)
HEMOGLOBIN: 13.4 GM/DL (ref 12–16)
IMMATURE GRANS (ABS): 0.02 THOU/MM3 (ref 0–0.07)
IMMATURE GRANULOCYTES: 0.2 %
INR BLD: 0.97 (ref 0.85–1.13)
LYMPHOCYTES # BLD: 25.4 %
LYMPHOCYTES ABSOLUTE: 2.5 THOU/MM3 (ref 1–4.8)
MCH RBC QN AUTO: 29.6 PG (ref 26–33)
MCHC RBC AUTO-ENTMCNC: 32.5 GM/DL (ref 32.2–35.5)
MCV RBC AUTO: 91.2 FL (ref 81–99)
MONOCYTES # BLD: 7.6 %
MONOCYTES ABSOLUTE: 0.8 THOU/MM3 (ref 0.4–1.3)
NUCLEATED RED BLOOD CELLS: 0 /100 WBC
PLATELET # BLD: 283 THOU/MM3 (ref 130–400)
PMV BLD AUTO: 9.9 FL (ref 9.4–12.4)
POTASSIUM SERPL-SCNC: 4.2 MEQ/L (ref 3.5–5.2)
RBC # BLD: 4.52 MILL/MM3 (ref 4.2–5.4)
SEG NEUTROPHILS: 57.5 %
SEGMENTED NEUTROPHILS ABSOLUTE COUNT: 5.8 THOU/MM3 (ref 1.8–7.7)
SODIUM BLD-SCNC: 141 MEQ/L (ref 135–145)
TOTAL PROTEIN: 7.7 G/DL (ref 6.1–8)
WBC # BLD: 10 THOU/MM3 (ref 4.8–10.8)

## 2019-01-18 PROCEDURE — 82248 BILIRUBIN DIRECT: CPT

## 2019-01-18 PROCEDURE — 82977 ASSAY OF GGT: CPT

## 2019-01-18 PROCEDURE — 83516 IMMUNOASSAY NONANTIBODY: CPT

## 2019-01-18 PROCEDURE — 36415 COLL VENOUS BLD VENIPUNCTURE: CPT

## 2019-01-18 PROCEDURE — 82140 ASSAY OF AMMONIA: CPT

## 2019-01-18 PROCEDURE — 85610 PROTHROMBIN TIME: CPT

## 2019-01-18 PROCEDURE — 80053 COMPREHEN METABOLIC PANEL: CPT

## 2019-01-18 PROCEDURE — 85025 COMPLETE CBC W/AUTO DIFF WBC: CPT

## 2019-01-20 LAB
F-ACTIN AB IGG: 9 UNITS (ref 0–19)
MITOCHONDRIAL ANTIBODY: 93.9 UNITS (ref 0–20)

## 2019-03-08 ENCOUNTER — HOSPITAL ENCOUNTER (OUTPATIENT)
Age: 59
Discharge: HOME | End: 2019-03-08
Payer: COMMERCIAL

## 2019-03-08 DIAGNOSIS — K74.3 PRIMARY BILIARY CHOLANGITIS (HCC): ICD-10-CM

## 2019-03-08 DIAGNOSIS — K75.4 AUTOIMMUNE HEPATITIS (HCC): ICD-10-CM

## 2019-03-08 DIAGNOSIS — R79.89 ELEVATED LFTS: ICD-10-CM

## 2019-03-08 LAB
ALBUMIN SERPL-MCNC: 3.9 G/DL (ref 3.5–5.1)
ALP BLD-CCNC: 281 U/L (ref 38–126)
ALT SERPL-CCNC: 37 U/L (ref 11–66)
AMMONIA: 30 UMOL/L (ref 11–60)
ANION GAP SERPL CALCULATED.3IONS-SCNC: 12 MEQ/L (ref 8–16)
AST SERPL-CCNC: 37 U/L (ref 5–40)
BILIRUB SERPL-MCNC: 0.4 MG/DL (ref 0.3–1.2)
BILIRUBIN DIRECT: < 0.2 MG/DL (ref 0–0.3)
BUN BLDV-MCNC: 18 MG/DL (ref 7–22)
CALCIUM SERPL-MCNC: 9.8 MG/DL (ref 8.5–10.5)
CHLORIDE BLD-SCNC: 104 MEQ/L (ref 98–111)
CO2: 26 MEQ/L (ref 23–33)
CREAT SERPL-MCNC: 0.6 MG/DL (ref 0.4–1.2)
ERYTHROCYTE [DISTWIDTH] IN BLOOD BY AUTOMATED COUNT: 13.9 % (ref 11.5–14.5)
ERYTHROCYTE [DISTWIDTH] IN BLOOD BY AUTOMATED COUNT: 46.8 FL (ref 35–45)
FERRITIN: 205 NG/ML (ref 10–291)
GFR SERPL CREATININE-BSD FRML MDRD: > 90 ML/MIN/1.73M2
GLUCOSE BLD-MCNC: 91 MG/DL (ref 70–108)
HCT VFR BLD CALC: 42.8 % (ref 37–47)
HEMOGLOBIN: 13.7 GM/DL (ref 12–16)
INR BLD: 0.94 (ref 0.85–1.13)
MCH RBC QN AUTO: 29.6 PG (ref 26–33)
MCHC RBC AUTO-ENTMCNC: 32 GM/DL (ref 32.2–35.5)
MCV RBC AUTO: 92.4 FL (ref 81–99)
PLATELET # BLD: 277 THOU/MM3 (ref 130–400)
PMV BLD AUTO: 9.7 FL (ref 9.4–12.4)
POTASSIUM SERPL-SCNC: 4.5 MEQ/L (ref 3.5–5.2)
RBC # BLD: 4.63 MILL/MM3 (ref 4.2–5.4)
SODIUM BLD-SCNC: 142 MEQ/L (ref 135–145)
TOTAL PROTEIN: 7.1 G/DL (ref 6.1–8)
WBC # BLD: 7.9 THOU/MM3 (ref 4.8–10.8)

## 2019-03-08 PROCEDURE — 85610 PROTHROMBIN TIME: CPT

## 2019-03-08 PROCEDURE — 82728 ASSAY OF FERRITIN: CPT

## 2019-03-08 PROCEDURE — 36415 COLL VENOUS BLD VENIPUNCTURE: CPT

## 2019-03-08 PROCEDURE — 85027 COMPLETE CBC AUTOMATED: CPT

## 2019-03-08 PROCEDURE — 83516 IMMUNOASSAY NONANTIBODY: CPT

## 2019-03-08 PROCEDURE — 80053 COMPREHEN METABOLIC PANEL: CPT

## 2019-03-08 PROCEDURE — 82140 ASSAY OF AMMONIA: CPT

## 2019-03-08 PROCEDURE — 82248 BILIRUBIN DIRECT: CPT

## 2019-03-10 LAB — MITOCHONDRIAL ANTIBODY: 101.2 UNITS (ref 0–20)

## 2019-03-16 ENCOUNTER — APPOINTMENT (OUTPATIENT)
Dept: GENERAL RADIOLOGY | Age: 59
End: 2019-03-16
Payer: COMMERCIAL

## 2019-03-16 ENCOUNTER — HOSPITAL ENCOUNTER (EMERGENCY)
Age: 59
Discharge: HOME | End: 2019-03-16
Payer: COMMERCIAL

## 2019-03-16 VITALS
OXYGEN SATURATION: 97 % | TEMPERATURE: 98 F | DIASTOLIC BLOOD PRESSURE: 90 MMHG | BODY MASS INDEX: 36.36 KG/M2 | RESPIRATION RATE: 16 BRPM | SYSTOLIC BLOOD PRESSURE: 140 MMHG | WEIGHT: 202 LBS | HEART RATE: 80 BPM

## 2019-03-16 DIAGNOSIS — M76.891 RIGHT HIP TENDINITIS: Primary | ICD-10-CM

## 2019-03-16 PROCEDURE — 99283 EMERGENCY DEPT VISIT LOW MDM: CPT

## 2019-03-16 PROCEDURE — 6370000000 HC RX 637 (ALT 250 FOR IP): Performed by: NURSE PRACTITIONER

## 2019-03-16 PROCEDURE — 73502 X-RAY EXAM HIP UNI 2-3 VIEWS: CPT

## 2019-03-16 RX ORDER — NAPROXEN 250 MG/1
250 TABLET ORAL ONCE
Status: COMPLETED | OUTPATIENT
Start: 2019-03-16 | End: 2019-03-16

## 2019-03-16 RX ORDER — LIDOCAINE 50 MG/G
OINTMENT TOPICAL
Qty: 30 G | Refills: 0 | Status: SHIPPED | OUTPATIENT
Start: 2019-03-16 | End: 2020-09-18

## 2019-03-16 RX ORDER — LIDOCAINE 4 G/G
1 PATCH TOPICAL DAILY
Status: DISCONTINUED | OUTPATIENT
Start: 2019-03-16 | End: 2019-03-16 | Stop reason: HOSPADM

## 2019-03-16 RX ADMIN — NAPROXEN 250 MG: 250 TABLET ORAL at 14:54

## 2019-03-16 ASSESSMENT — ENCOUNTER SYMPTOMS
ABDOMINAL PAIN: 0
SINUS PRESSURE: 0
SHORTNESS OF BREATH: 0
WHEEZING: 0
COUGH: 0
DIARRHEA: 0
BLOOD IN STOOL: 0
RHINORRHEA: 0
NAUSEA: 0
VOICE CHANGE: 0
CHEST TIGHTNESS: 0
BACK PAIN: 0
ABDOMINAL DISTENTION: 0
CONSTIPATION: 0
SORE THROAT: 0
VOMITING: 0

## 2019-03-16 ASSESSMENT — PAIN DESCRIPTION - ORIENTATION: ORIENTATION: RIGHT

## 2019-03-16 ASSESSMENT — PAIN DESCRIPTION - LOCATION: LOCATION: HIP

## 2019-03-16 ASSESSMENT — PAIN DESCRIPTION - DESCRIPTORS: DESCRIPTORS: ACHING

## 2019-03-16 ASSESSMENT — PAIN DESCRIPTION - PAIN TYPE: TYPE: ACUTE PAIN

## 2019-03-16 ASSESSMENT — PAIN SCALES - GENERAL: PAINLEVEL_OUTOF10: 6

## 2019-04-05 ENCOUNTER — HOSPITAL ENCOUNTER (OUTPATIENT)
Age: 59
Discharge: HOME | End: 2019-04-05
Payer: COMMERCIAL

## 2019-04-05 DIAGNOSIS — L29.9 ITCHING: ICD-10-CM

## 2019-04-05 DIAGNOSIS — K76.9 CHRONIC LIVER DISEASE: ICD-10-CM

## 2019-04-05 DIAGNOSIS — R74.8 ELEVATED SERUM GGT LEVEL: ICD-10-CM

## 2019-04-05 DIAGNOSIS — R74.8 ELEVATED ALKALINE PHOSPHATASE LEVEL: ICD-10-CM

## 2019-04-05 LAB
ALBUMIN SERPL-MCNC: 3.9 G/DL (ref 3.5–5.1)
ALP BLD-CCNC: 295 U/L (ref 38–126)
ALT SERPL-CCNC: 44 U/L (ref 11–66)
AMMONIA: 52 UMOL/L (ref 11–60)
ANION GAP SERPL CALCULATED.3IONS-SCNC: 11 MEQ/L (ref 8–16)
AST SERPL-CCNC: 66 U/L (ref 5–40)
BILIRUB SERPL-MCNC: 0.5 MG/DL (ref 0.3–1.2)
BILIRUBIN DIRECT: < 0.2 MG/DL (ref 0–0.3)
BUN BLDV-MCNC: 19 MG/DL (ref 7–22)
CALCIUM SERPL-MCNC: 9.5 MG/DL (ref 8.5–10.5)
CHLORIDE BLD-SCNC: 103 MEQ/L (ref 98–111)
CO2: 28 MEQ/L (ref 23–33)
CREAT SERPL-MCNC: 0.6 MG/DL (ref 0.4–1.2)
ERYTHROCYTE [DISTWIDTH] IN BLOOD BY AUTOMATED COUNT: 14.3 % (ref 11.5–14.5)
ERYTHROCYTE [DISTWIDTH] IN BLOOD BY AUTOMATED COUNT: 48.9 FL (ref 35–45)
FERRITIN: 210 NG/ML (ref 10–291)
GAMMA GLUTAMYL TRANSFERASE: 597 U/L (ref 8–69)
GFR SERPL CREATININE-BSD FRML MDRD: > 90 ML/MIN/1.73M2
GLUCOSE BLD-MCNC: 105 MG/DL (ref 70–108)
HCT VFR BLD CALC: 43.9 % (ref 37–47)
HEMOGLOBIN: 13.8 GM/DL (ref 12–16)
INR BLD: 0.93 (ref 0.85–1.13)
MCH RBC QN AUTO: 29.4 PG (ref 26–33)
MCHC RBC AUTO-ENTMCNC: 31.4 GM/DL (ref 32.2–35.5)
MCV RBC AUTO: 93.6 FL (ref 81–99)
PLATELET # BLD: 267 THOU/MM3 (ref 130–400)
PMV BLD AUTO: 9.7 FL (ref 9.4–12.4)
POTASSIUM SERPL-SCNC: 4.4 MEQ/L (ref 3.5–5.2)
RBC # BLD: 4.69 MILL/MM3 (ref 4.2–5.4)
SODIUM BLD-SCNC: 142 MEQ/L (ref 135–145)
TOTAL PROTEIN: 7.3 G/DL (ref 6.1–8)
WBC # BLD: 8.9 THOU/MM3 (ref 4.8–10.8)

## 2019-04-05 PROCEDURE — 36415 COLL VENOUS BLD VENIPUNCTURE: CPT

## 2019-04-05 PROCEDURE — 82248 BILIRUBIN DIRECT: CPT

## 2019-04-05 PROCEDURE — 82140 ASSAY OF AMMONIA: CPT

## 2019-04-05 PROCEDURE — 82728 ASSAY OF FERRITIN: CPT

## 2019-04-05 PROCEDURE — 85027 COMPLETE CBC AUTOMATED: CPT

## 2019-04-05 PROCEDURE — 80053 COMPREHEN METABOLIC PANEL: CPT

## 2019-04-05 PROCEDURE — 85610 PROTHROMBIN TIME: CPT

## 2019-04-05 PROCEDURE — 82977 ASSAY OF GGT: CPT

## 2019-04-05 PROCEDURE — 83516 IMMUNOASSAY NONANTIBODY: CPT

## 2019-04-07 LAB
F-ACTIN AB IGG: 9 UNITS (ref 0–19)
MITOCHONDRIAL ANTIBODY: 85.7 UNITS (ref 0–20)

## 2019-04-15 ENCOUNTER — HOSPITAL ENCOUNTER (OUTPATIENT)
Dept: PHYSICAL THERAPY | Age: 59
Setting detail: THERAPIES SERIES
Discharge: HOME | End: 2019-04-15
Payer: COMMERCIAL

## 2019-04-15 PROCEDURE — 97035 APP MDLTY 1+ULTRASOUND EA 15: CPT

## 2019-04-15 PROCEDURE — 97161 PT EVAL LOW COMPLEX 20 MIN: CPT

## 2019-04-15 ASSESSMENT — PAIN DESCRIPTION - LOCATION: LOCATION: HIP

## 2019-04-15 ASSESSMENT — PAIN DESCRIPTION - ORIENTATION: ORIENTATION: RIGHT

## 2019-04-15 ASSESSMENT — PAIN SCALES - GENERAL: PAINLEVEL_OUTOF10: 6

## 2019-04-15 NOTE — FLOWSHEET NOTE
pain radiating into right leg but less frequent now. Pt reports right pain aggrevated with prolonged sitting or laying down, causing difficulty getting up. Pt taking Aleve but doesn't seem to help. Pt uses heating pad mainly when she sleeps, which helps. Pt sleeps left sidelying. Pt reports pain increases to 9/10, usually doesn't go lower than 6/10.        Pain:  Patient Currently in Pain: Yes  Pain Assessment: 0-10  Pain Level: 6  Pain Location: Hip  Pain Orientation: Right     Social/Functional History:    Lives With: Alone  Type of Home: ([facility])  Home Layout: Two level, Laundry in basement, Bed/Bath upstairs  Home Access: Stairs to enter with rails  Entrance Stairs - Number of Steps: 2  Entrance Stairs - Rails: Left   ADL Assistance: Independent  Homemaking Assistance: Independent  Ambulation Assistance: Independent  Transfer Assistance: Independent    Active : Yes  Mode of Transportation: Car  Occupation: Full time employment  Type of occupation: works at [employer] working on [employer] & Hobbies: reading, watching TV    Objective  Overall Orientation Status: Within Normal Limits            Observation/Palpation  Palpation: tender right greater trochanter  Observation: excess abdominal tissue        ROM RLE: hip flexion min restriction, IR and ER min to mod restriction with pain, extension mod restriction with pain, tight ITB with pain, mild hamstring tightness noted  ROM LLE: hip WFL, mild hamstring tightness noted    Strength RLE: WFL  Comment: SLR 4/5 with pain, sidelying hip ABD 4/5 with pain, ER 4+/5, with pain, hamstring 5/5, quad 4/5    Strength LLE: WFL  Comment: SLR 4/5, sidelying hip ABD 4/5, ER 4+/5, hamstring 5/5, quad 4/5               Special Tests: positive Arti's and Hip Scour on right          Overall Sensation Status: WNL           Rolling to Left: Modified independent  Rolling to Right: Modified independent  Supine to Sit: Independent(log roll technique)  Sit to Supine: Independent                Transfers  Sit to Stand: Modified independent  Stand to sit: Modified independent            Ambulation 1  Surface: carpet  Assistance: Independent  Quality of Gait: decreased pace; mild antalgia with decreased stance time on right  Distance: 40ft      Stairs  # Steps : 4  Stairs Height: 6\"  Rails: Right ascending  Device: No Device  Assistance: Modified independent   Comment: ascended reciprocally, descending nonreciprocally- pt reports she generally negotiates nonreciprocally                 Exercises  Exercise 1: Phonophoresis to right greater trochanter x8 min, 3.3MHz, cont, 1.0 w/cm2 in left sidelying  Exercise 2: R hip flexor stretch off EOB, piriformis knee to opp axilla stretch, and sidelying ITB stretch 3x15 sec hold each          Activity Tolerance:  Activity Tolerance: Patient Tolerated treatment well  Activity Tolerance: Pt reports no change in pain at end of session. Assessment: Body structures, Functions, Activity limitations: Decreased ROM, Decreased strength, Increased Pain  Assessment: 62year old presents with acute right hip pain secondary to arthritis. Pt demos tight hip muscles, hip weakness, antalgic gait and nonreciprocating stair negotiation, which will be addressed with PT. Initiated phonophoresis to decrease pain and inflammation- no skin irritation noted. Initiated hip stretches with fair tolerance. Prognosis: Good       Patient Education:  Patient Education: Educated pt on POC, participation contract, phono, and HEP of stretches with handout provided.           Plan:  Times per week: 3x  Plan weeks: 8 weeks  Specific instructions for Next Treatment: leg raises, Nustep, standing exercises, balance exercises  Current Treatment Recommendations: Strengthening, ROM, Balance Training, Gait Training, Stair training, Manual Therapy - Soft Tissue Mobilization, Safety Education & Training, Modalities, Home Exercise Program, Patient/Caregiver Education & Training, Aquatics  Plan Comment: POC initiated. History: Personal factors or comorbidities that impact plan of care -  Moderate Complexity: 1-2 personal factors or comorbidities. See history section above for details. Examination: Body structures and functions, activity limitations, participation restrictions; using standardized tests and measures - Moderate Complexity: 3 or morebody structures and functional, activity limitations and/or participation restrictions. See restrictions and objective section above for details. Clinical Presentation: Low - Stable and Uncomplicated: no evolving characteristics, pain and radicular pain lessening    Decision Making: Low Complexity due to above reasons. Decision making was based on patient assessment and decision making process in determining plan of care and establishing reasonable expectations for measurable functional outcomes. Evaluation Complexity: Based on the findings of patient history, examination, clinical presentation, and decision making during this evaluation, the evaluation of Gerardine Reining  is of low complexity. Goals:  Patient goals : Less right hip pain    Short term goals  Time Frame for Short term goals: 4 weeks  Short term goal 1: Pt will report <5/10 right hip pain at worst to tolerate prolonged sitting and laying down. Short term goal 2: Pt will demo minimal to no right hip AROM restriction to improve flexibility for symptom control and strengthening. Short term goal 3: Pt will improve R hip strength to 4+/5 to negotiate stairs reciprocally at home with 1 handrail. Short term goal 4: Pt will ambulate without antalgic pattern community distances on various terrain. Long term goals  Time Frame for Long term goals : 8 weeks  Long term goal 1: Pt will be independent and compliant with HEP to achieve above goals.      Siomara Arora, PT

## 2019-04-16 ENCOUNTER — HOSPITAL ENCOUNTER (OUTPATIENT)
Dept: PHYSICAL THERAPY | Age: 59
Setting detail: THERAPIES SERIES
Discharge: HOME | End: 2019-04-16
Payer: COMMERCIAL

## 2019-04-16 PROCEDURE — 97110 THERAPEUTIC EXERCISES: CPT

## 2019-04-16 PROCEDURE — 97035 APP MDLTY 1+ULTRASOUND EA 15: CPT

## 2019-04-16 ASSESSMENT — PAIN SCALES - GENERAL: PAINLEVEL_OUTOF10: 7

## 2019-04-16 ASSESSMENT — PAIN DESCRIPTION - PAIN TYPE: TYPE: ACUTE PAIN

## 2019-04-16 ASSESSMENT — PAIN DESCRIPTION - LOCATION: LOCATION: HIP

## 2019-04-16 ASSESSMENT — PAIN DESCRIPTION - ORIENTATION: ORIENTATION: RIGHT

## 2019-04-16 NOTE — PROGRESS NOTES
[place]     Time In: 0192  Time Out: 7284  Minutes: 45  Timed Code Treatment Minutes: 45 Minutes             Date: 2019  Patient Name: Johanna Harry,  Gender:  female        CSN: [id number]   : 1960  (62 y.o.)  Referral Date : 17    Referring Practitioner: Constantine Armas MD      Diagnosis: M25.551- Pain in right hip; M70.61- Trochanteric bursitis, right hip; M16.11 - Unilateral primary osteoarthritis, right hip  Treatment Diagnosis: acute right hip pain, right hip weakness, gait abnormality   Additional Pertinent Hx: PMH: HTN, incontinence, diabetes, arthritis. General:  PT Visit Information  Onset Date: 04/15/19  PT Insurance Information: [place]- no visit limit, aquatics and modalities covered, NO massage  Total # of Visits to Date: 2  Plan of Care/Certification Expiration Date: 06/10/19  Progress Note Counter: 2/10 for PN. Subjective:  Chart Reviewed: Yes  Patient assessed for rehabilitation services?: Yes  Family / Caregiver Present: Yes  Comments: Follow up with Dr. Darshana Jarrell in 3-4 weeks if hip doesn't feel better for possible injections. XR findings: right hip OA and bursitis. Subjective: Pt stated her R hip felt better after last session. Pt stated it gets aggrivated with sitting and lying,     Pain:  Patient Currently in Pain: Yes  Pain Level: 7  Pain Type: Acute pain  Pain Location: Hip  Pain Orientation: Right      Objective                    Exercises  Exercise 1: Phonophoresis to right greater trochanter x8 min, 3.3MHz, cont, 1.0 w/cm2 in left sidelying  Exercise 2: R hip flexor stretch off EOB, piriformis knee to opp axilla stretch, and sidelying ITB stretch 3x15 sec hold each  Exercise 3: Nustep level 3 seat8 UE 9 for 5 min. Exercise 4: standing in // bars: heel toe raises, marching, mini squats, 3 way hip X10  Exercise 5: step ups on 4in.  step forward B lead X10  Exercise 6: rockerboard forward/lateral taps X15 with 20 balance mild hand taps required         Activity Tolerance:  Activity Tolerance: Patient Tolerated treatment well    Assessment: Body structures, Functions, Activity limitations: Decreased ROM, Decreased strength, Increased Pain  Assessment: Pt tolerated session well. Pt continues to work on R hip strengthening ex. and balance for added stability of hip joint. Pt to monitor pain levels after phonophoresis. Prognosis: Good       Patient Education:  Patient Education: cont with hip stretches for HEP                      Plan:  Times per week: 3x  Plan weeks: 8 weeks  Specific instructions for Next Treatment: leg raises, Nustep, standing exercises, balance exercises  Current Treatment Recommendations: Strengthening, ROM, Balance Training, Gait Training, Stair training, Manual Therapy - Soft Tissue Mobilization, Safety Education & Training, Modalities, Home Exercise Program, Patient/Caregiver Education & Training, Aquatics  Plan Comment: cont per PT POC    Goals:  Patient goals : Less right hip pain    Short term goals  Time Frame for Short term goals: 4 weeks  Short term goal 1: Pt will report <5/10 right hip pain at worst to tolerate prolonged sitting and laying down. Short term goal 2: Pt will demo minimal to no right hip AROM restriction to improve flexibility for symptom control and strengthening. Short term goal 3: Pt will improve R hip strength to 4+/5 to negotiate stairs reciprocally at home with 1 handrail. Short term goal 4: Pt will ambulate without antalgic pattern community distances on various terrain. Long term goals  Time Frame for Long term goals : 8 weeks  Long term goal 1: Pt will be independent and compliant with HEP to achieve above goals.      Rl Roberts, PTA

## 2019-04-19 ENCOUNTER — APPOINTMENT (OUTPATIENT)
Dept: PHYSICAL THERAPY | Age: 59
End: 2019-04-19
Payer: COMMERCIAL

## 2019-04-23 ENCOUNTER — HOSPITAL ENCOUNTER (OUTPATIENT)
Dept: PHYSICAL THERAPY | Age: 59
Setting detail: THERAPIES SERIES
Discharge: HOME | End: 2019-04-23
Payer: COMMERCIAL

## 2019-04-23 PROCEDURE — 97140 MANUAL THERAPY 1/> REGIONS: CPT

## 2019-04-23 PROCEDURE — 97035 APP MDLTY 1+ULTRASOUND EA 15: CPT

## 2019-04-23 ASSESSMENT — PAIN SCALES - GENERAL: PAINLEVEL_OUTOF10: 7

## 2019-04-23 ASSESSMENT — PAIN DESCRIPTION - LOCATION: LOCATION: HIP

## 2019-04-23 ASSESSMENT — PAIN DESCRIPTION - ORIENTATION: ORIENTATION: RIGHT

## 2019-04-23 NOTE — PROGRESS NOTES
[address] [facility]     Time In: 9950  Time Out: [address]  Minutes: 34  Timed Code Treatment Minutes: 34 Minutes  Date: 2019  Patient Name: Geni Hernandez,  Gender:  female        CSN: [id number]   : 1960  (62 y.o.)       Referring Practitioner: Marc Kumar MD      Diagnosis: M25.551- Pain in right hip; M70.61- Trochanteric bursitis, right hip; M16.11 - Unilateral primary osteoarthritis, right hip  Treatment Diagnosis: acute right hip pain, right hip weakness, gait abnormality   Additional Pertinent Hx: PMH: HTN, incontinence, diabetes, arthritis. General:  PT Visit Information  Onset Date: 04/15/19  PT Insurance Information: [place]- no visit limit, aquatics and modalities covered, NO massage  Total # of Visits to Date: 3  Plan of Care/Certification Expiration Date: 06/10/19  Progress Note Counter: 3/10 for PN. Subjective:  Chart Reviewed: Yes  Patient assessed for rehabilitation services?: Yes  Family / Caregiver Present: No  Comments: Follow up with Dr. Tawana Alba in 3-4 weeks if hip doesn't feel better for possible injections. XR findings: right hip OA and bursitis. Subjective: Patient states her right hip has hurt for the last 2 days. Patient just came form work. Pain:  Patient Currently in Pain: Yes  Pain Assessment: 0-10  Pain Level: 7  Pain Location: Hip  Pain Orientation: Right      Objective  Follows Commands: Within Functional Limits       Exercises  Exercise 1: Phonophoresis to right greater trochanter x8 min, 3.3MHz, cont, 1.0 w/cm2 in left sidelying  Exercise 3: Nustep level 3 seat8 UE 9 for 5 min. Exercise 7: Myofascial work x 10 minutes to right ITBand and piriformis /hip area        Activity Tolerance: Tolerated fair. Limited  By pain. Assessment:   Body structures, Functions, Activity limitations: Decreased ROM, Decreased strength, Increased Pain  Assessment: Patient present with complaints of increase pain over the last few days. States she worked 10 hours today. Continued with phonophorsis. Trail of myofascial work  this date. No point tenderness to ITBand but tightness noted. Point tender to right hip/piriformis area. Patient report after session that she felt worse, ' this is how I walke when I get up in the morning. \" Suggested patient apply ice when she get home. Will relay to P.T. Discharge Recommendations: Continue to assess pending progress    Patient Education:  Patient Education: cont with hip stretches for HEP    Plan:  Times per week: 3x  Plan weeks: 8 weeks  Specific instructions for Next Treatment: leg raises, Nustep, standing exercises, balance exercises  Current Treatment Recommendations: Strengthening, ROM, Balance Training, Gait Training, Stair training, Manual Therapy - Soft Tissue Mobilization, Safety Education & Training, Modalities, Home Exercise Program, Patient/Caregiver Education & Training, Aquatics  Plan Comment: cont per PT POC    Goals:  Patient goals : Less right hip pain    Short term goals  Time Frame for Short term goals: 4 weeks  Short term goal 1: Pt will report <5/10 right hip pain at worst to tolerate prolonged sitting and laying down. Short term goal 2: Pt will demo minimal to no right hip AROM restriction to improve flexibility for symptom control and strengthening. Short term goal 3: Pt will improve R hip strength to 4+/5 to negotiate stairs reciprocally at home with 1 handrail. Short term goal 4: Pt will ambulate without antalgic pattern community distances on various terrain. Long term goals  Time Frame for Long term goals : 8 weeks  Long term goal 1: Pt will be independent and compliant with HEP to achieve above goals.      Maite Peralta  PTA [id number]

## 2019-04-24 ENCOUNTER — HOSPITAL ENCOUNTER (OUTPATIENT)
Dept: PHYSICAL THERAPY | Age: 59
Setting detail: THERAPIES SERIES
Discharge: HOME | End: 2019-04-24
Payer: COMMERCIAL

## 2019-04-24 PROCEDURE — 97110 THERAPEUTIC EXERCISES: CPT

## 2019-04-24 PROCEDURE — 97035 APP MDLTY 1+ULTRASOUND EA 15: CPT

## 2019-04-24 ASSESSMENT — PAIN DESCRIPTION - PAIN TYPE: TYPE: ACUTE PAIN

## 2019-04-24 ASSESSMENT — PAIN SCALES - GENERAL: PAINLEVEL_OUTOF10: 5

## 2019-04-24 ASSESSMENT — PAIN DESCRIPTION - ORIENTATION: ORIENTATION: RIGHT

## 2019-04-24 ASSESSMENT — PAIN DESCRIPTION - LOCATION: LOCATION: HIP

## 2019-04-26 ENCOUNTER — HOSPITAL ENCOUNTER (OUTPATIENT)
Dept: PHYSICAL THERAPY | Age: 59
Setting detail: THERAPIES SERIES
Discharge: HOME | End: 2019-04-26
Payer: COMMERCIAL

## 2019-04-26 PROCEDURE — 97035 APP MDLTY 1+ULTRASOUND EA 15: CPT

## 2019-04-26 PROCEDURE — 97110 THERAPEUTIC EXERCISES: CPT

## 2019-04-26 ASSESSMENT — PAIN DESCRIPTION - LOCATION: LOCATION: HIP

## 2019-04-26 ASSESSMENT — PAIN DESCRIPTION - PAIN TYPE: TYPE: ACUTE PAIN

## 2019-04-26 ASSESSMENT — PAIN DESCRIPTION - ORIENTATION: ORIENTATION: RIGHT

## 2019-04-26 ASSESSMENT — PAIN SCALES - GENERAL: PAINLEVEL_OUTOF10: 5

## 2019-04-26 NOTE — PROGRESS NOTES
[facility]     Time In: 9083  Time Out: [address] [facility]  Minutes: 49  Timed Code Treatment Minutes: 49 Minutes             Date: 2019  Patient Name: Jenna Nava,  Gender:  female        CSN: [id number]   : 1960  (62 y.o.)  Referral Date : 17    Referring Practitioner: Brian Bryant MD      Diagnosis: M25.551- Pain in right hip; M70.61- Trochanteric bursitis, right hip; M16.11 - Unilateral primary osteoarthritis, right hip  Treatment Diagnosis: acute right hip pain, right hip weakness, gait abnormality   Additional Pertinent Hx: PMH: HTN, incontinence, diabetes, arthritis. General:  PT Visit Information  Onset Date: 04/15/19  PT Insurance Information: [place]- no visit limit, aquatics and modalities covered, NO massage  Total # of Visits to Date: 5  Plan of Care/Certification Expiration Date: 06/10/19  Progress Note Counter: 5/10 for PN. Subjective:  Family / Caregiver Present: No  Comments: Follow up with Dr. Melody Hernandez in 3-4 weeks if hip doesn't feel better for possible injections. XR findings: right hip OA and bursitis. Subjective: Patient reporting pain level 4-5/10 and that she did not work today so hip is not as sore. Pain:  Patient Currently in Pain: Yes  Pain Assessment: 0-10  Pain Level: 5  Pain Type: Acute pain  Pain Location: Hip  Pain Orientation: Right      Objective  Exercises  Exercise 1: Phonophoresis to right greater trochanter x8 min, 3.3MHz, cont, 1.0 w/cm2 in left sidelying  Exercise 2: R hip flexor stretch, and right hamstring at step 15 seconds x 3  Exercise 3: Nustep level 3 seat8 UE 9 for 5 min. Exercise 4: standing in // bars: heel toe raises, marching, mini squats, 3 way hip X15  Exercise 5: step ups on 4in.  step forward B lead X15  Exercise 6: rockerboard forward/lateral taps X15 with 30 balance min hand taps  Exercise 7: Knee flexion/extension at NK table 2.5# x 15 - denies pain  Exercise 8: Supine SLR 10 x 5 seconds R, Left side lying hip abduction, clam shell, reverse clam shell x 10 each  Exercise 9: bridge 5 x 5 seconds         Activity Tolerance:  Activity Tolerance: Treatment limited secondary to medical complications (free text)  Activity Tolerance: no skin irritation post treatment    Assessment:  Assessment: Added reps as noted and added mat table exercises for right hip strength with patient needing multiple cues to keep hips stacked. Continued with phono with patient reporting pain level 4/10 and having no other complains at end of session. Prognosis: Good       Patient Education:  Patient Education: Continue with HEP and monitor response to progressions. Gave handout for SLR, side lying hip abduction, clam shell, reverse clam shell and bridges. Plan:  Times per week: 3x  Plan weeks: 8 weeks  Specific instructions for Next Treatment: leg raises, Nustep, standing exercises, balance exercises  Current Treatment Recommendations: Strengthening, ROM, Balance Training, Gait Training, Stair training, Manual Therapy - Soft Tissue Mobilization, Safety Education & Training, Modalities, Home Exercise Program, Patient/Caregiver Education & Training, Aquatics  Plan Comment: cont per PT POC    Goals:  Patient goals : Less right hip pain    Short term goals  Time Frame for Short term goals: 4 weeks  Short term goal 1: Pt will report <5/10 right hip pain at worst to tolerate prolonged sitting and laying down. Short term goal 2: Pt will demo minimal to no right hip AROM restriction to improve flexibility for symptom control and strengthening. Short term goal 3: Pt will improve R hip strength to 4+/5 to negotiate stairs reciprocally at home with 1 handrail. Short term goal 4: Pt will ambulate without antalgic pattern community distances on various terrain.      Long term goals  Time Frame for Long term goals : 8 weeks  Long term goal 1: Pt will be independent and compliant with HEP to achieve above goals.      John Sims, [id number]

## 2019-04-30 ENCOUNTER — HOSPITAL ENCOUNTER (OUTPATIENT)
Dept: PHYSICAL THERAPY | Age: 59
Setting detail: THERAPIES SERIES
Discharge: HOME | End: 2019-04-30
Payer: COMMERCIAL

## 2019-04-30 PROCEDURE — 97110 THERAPEUTIC EXERCISES: CPT

## 2019-04-30 NOTE — PROGRESS NOTES
shell x 10 each  Exercise 9: bridge 10x 5 seconds  Exercise 10: Hydrohip L2 x 10 B       Activity Tolerance: Tolerated well. Assessment: Body structures, Functions, Activity limitations: Decreased functional mobility , Decreased strength, Decreased ROM, Decreased balance, Decreased ADL status  Assessment: Addressed strenthening and balance with no complaints of pain. Added hydrohip. Held phonophoresis due to no pain. Patient in agreement. Patient Education:  Patient Education: Monitor pain. Continue with HEP. Plan:  Times per week: 3x  Plan weeks: 8 weeks  Specific instructions for Next Treatment: leg raises, Nustep, standing exercises, balance exercises  Current Treatment Recommendations: Strengthening, ROM, Balance Training, Gait Training, Stair training, Manual Therapy - Soft Tissue Mobilization, Safety Education & Training, Modalities, Home Exercise Program, Patient/Caregiver Education & Training, Aquatics  Plan Comment: cont per PT POC    Goals:  Patient goals : Less right hip pain    Short term goals  Time Frame for Short term goals: 4 weeks  Short term goal 1: Pt will report <5/10 right hip pain at worst to tolerate prolonged sitting and laying down. Short term goal 2: Pt will demo minimal to no right hip AROM restriction to improve flexibility for symptom control and strengthening. Short term goal 3: Pt will improve R hip strength to 4+/5 to negotiate stairs reciprocally at home with 1 handrail. Short term goal 4: Pt will ambulate without antalgic pattern community distances on various terrain. Long term goals  Time Frame for Long term goals : 8 weeks  Long term goal 1: Pt will be independent and compliant with HEP to achieve above goals.      [facility]  PTA [id number]

## 2019-05-01 ENCOUNTER — HOSPITAL ENCOUNTER (OUTPATIENT)
Dept: PHYSICAL THERAPY | Age: 59
Setting detail: THERAPIES SERIES
Discharge: HOME | End: 2019-05-01
Payer: COMMERCIAL

## 2019-05-01 PROCEDURE — 97035 APP MDLTY 1+ULTRASOUND EA 15: CPT

## 2019-05-01 PROCEDURE — 97110 THERAPEUTIC EXERCISES: CPT

## 2019-05-01 ASSESSMENT — PAIN DESCRIPTION - PAIN TYPE: TYPE: ACUTE PAIN

## 2019-05-01 ASSESSMENT — PAIN SCALES - GENERAL: PAINLEVEL_OUTOF10: 5

## 2019-05-01 ASSESSMENT — PAIN DESCRIPTION - ORIENTATION: ORIENTATION: RIGHT

## 2019-05-01 ASSESSMENT — PAIN DESCRIPTION - LOCATION: LOCATION: HIP

## 2019-05-01 NOTE — PROGRESS NOTES
abduction, clam shell, reverse clam shell x 10 each         Activity Tolerance:  Activity Tolerance: Patient Tolerated treatment well  Activity Tolerance: no skin irritation post treatment    Assessment:  Assessment: Progressed by adding standing 3 way hip with resistance band with patient having more weakness on right side and reporting right side feeling a little sore during. Did resume phono due to patient having pain today. Patient reporting pain level the same at end of session and having no other complains. Prognosis: Good       Patient Education:  Patient Education: Continue with HEP and monitor response to progressions made. Plan:  Times per week: 3x  Plan weeks: 8 weeks  Specific instructions for Next Treatment: leg raises, Nustep, standing exercises, balance exercises  Current Treatment Recommendations: Strengthening, ROM, Balance Training, Gait Training, Stair training, Manual Therapy - Soft Tissue Mobilization, Safety Education & Training, Modalities, Home Exercise Program, Patient/Caregiver Education & Training, Aquatics  Plan Comment: cont per PT POC    Goals:  Patient goals : Less right hip pain    Short term goals  Time Frame for Short term goals: 4 weeks  Short term goal 1: Pt will report <5/10 right hip pain at worst to tolerate prolonged sitting and laying down. Short term goal 2: Pt will demo minimal to no right hip AROM restriction to improve flexibility for symptom control and strengthening. Short term goal 3: Pt will improve R hip strength to 4+/5 to negotiate stairs reciprocally at home with 1 handrail. Short term goal 4: Pt will ambulate without antalgic pattern community distances on various terrain. Long term goals  Time Frame for Long term goals : 8 weeks  Long term goal 1: Pt will be independent and compliant with HEP to achieve above goals.      Randy Meneses, [id number]

## 2019-05-03 ENCOUNTER — HOSPITAL ENCOUNTER (OUTPATIENT)
Dept: PHYSICAL THERAPY | Age: 59
Setting detail: THERAPIES SERIES
End: 2019-05-03
Payer: COMMERCIAL

## 2019-05-06 ENCOUNTER — HOSPITAL ENCOUNTER (OUTPATIENT)
Dept: PHYSICAL THERAPY | Age: 59
Setting detail: THERAPIES SERIES
Discharge: HOME | End: 2019-05-06
Payer: COMMERCIAL

## 2019-05-06 NOTE — PROGRESS NOTES
[address] 58  PHYSICAL THERAPY MISSED TREATMENT NOTE  OUTPATIENT REHABILITATION    Date: 2019  Patient Name: Johanna Harry        MRN: [id number]   : 1960  (62 y.o.)  Gender: female   Referring Practitioner: Constantine Armas MD  Diagnosis: M25.551- Pain in right hip; M70.61- Trochanteric bursitis, right hip; M16.11 - Unilateral primary osteoarthritis, right hip    PT Visit Information  No Show: 1  Canceled Appointment: 1    REASON FOR MISSED TREATMENT:  No Show/No Call. Left message regarding missed reassessment and instructed pt to call to reschedule.      Isaak DOET, #[id number]

## 2019-05-10 ENCOUNTER — HOSPITAL ENCOUNTER (OUTPATIENT)
Dept: PHYSICAL THERAPY | Age: 59
Setting detail: THERAPIES SERIES
Discharge: HOME | End: 2019-05-10
Payer: COMMERCIAL

## 2019-05-10 ENCOUNTER — HOSPITAL ENCOUNTER (OUTPATIENT)
Age: 59
Discharge: HOME | End: 2019-05-10
Payer: COMMERCIAL

## 2019-05-10 DIAGNOSIS — R74.01 ELEVATED AST (SGOT): ICD-10-CM

## 2019-05-10 DIAGNOSIS — R74.8 ELEVATED SERUM GGT LEVEL: ICD-10-CM

## 2019-05-10 DIAGNOSIS — K74.3 PRIMARY BILIARY CHOLANGITIS (HCC): ICD-10-CM

## 2019-05-10 LAB
AFP-TUMOR MARKER: 2.5 UG/L
ALBUMIN SERPL-MCNC: 3.9 G/DL (ref 3.5–5.1)
ALP BLD-CCNC: 303 U/L (ref 38–126)
ALT SERPL-CCNC: 45 U/L (ref 11–66)
ANION GAP SERPL CALCULATED.3IONS-SCNC: 10 MEQ/L (ref 8–16)
AST SERPL-CCNC: 51 U/L (ref 5–40)
BILIRUB SERPL-MCNC: 0.5 MG/DL (ref 0.3–1.2)
BILIRUBIN DIRECT: < 0.2 MG/DL (ref 0–0.3)
BUN BLDV-MCNC: 18 MG/DL (ref 7–22)
CALCIUM SERPL-MCNC: 9.8 MG/DL (ref 8.5–10.5)
CHLORIDE BLD-SCNC: 102 MEQ/L (ref 98–111)
CO2: 27 MEQ/L (ref 23–33)
CREAT SERPL-MCNC: 0.8 MG/DL (ref 0.4–1.2)
ERYTHROCYTE [DISTWIDTH] IN BLOOD BY AUTOMATED COUNT: 14.3 % (ref 11.5–14.5)
ERYTHROCYTE [DISTWIDTH] IN BLOOD BY AUTOMATED COUNT: 48.7 FL (ref 35–45)
FERRITIN: 197 NG/ML (ref 10–291)
GAMMA GLUTAMYL TRANSFERASE: 811 U/L (ref 8–69)
GFR SERPL CREATININE-BSD FRML MDRD: 73 ML/MIN/1.73M2
GLUCOSE BLD-MCNC: 114 MG/DL (ref 70–108)
HCT VFR BLD CALC: 43 % (ref 37–47)
HEMOGLOBIN: 13.7 GM/DL (ref 12–16)
INR BLD: 0.93 (ref 0.85–1.13)
MCH RBC QN AUTO: 29.8 PG (ref 26–33)
MCHC RBC AUTO-ENTMCNC: 31.9 GM/DL (ref 32.2–35.5)
MCV RBC AUTO: 93.5 FL (ref 81–99)
PLATELET # BLD: 246 THOU/MM3 (ref 130–400)
PMV BLD AUTO: 9.9 FL (ref 9.4–12.4)
POTASSIUM SERPL-SCNC: 4.5 MEQ/L (ref 3.5–5.2)
RBC # BLD: 4.6 MILL/MM3 (ref 4.2–5.4)
SODIUM BLD-SCNC: 139 MEQ/L (ref 135–145)
TOTAL PROTEIN: 7.3 G/DL (ref 6.1–8)
WBC # BLD: 7.8 THOU/MM3 (ref 4.8–10.8)

## 2019-05-10 PROCEDURE — 97110 THERAPEUTIC EXERCISES: CPT

## 2019-05-10 PROCEDURE — 82248 BILIRUBIN DIRECT: CPT

## 2019-05-10 PROCEDURE — 82977 ASSAY OF GGT: CPT

## 2019-05-10 PROCEDURE — 85027 COMPLETE CBC AUTOMATED: CPT

## 2019-05-10 PROCEDURE — 82105 ALPHA-FETOPROTEIN SERUM: CPT

## 2019-05-10 PROCEDURE — 85610 PROTHROMBIN TIME: CPT

## 2019-05-10 PROCEDURE — 82728 ASSAY OF FERRITIN: CPT

## 2019-05-10 PROCEDURE — 80053 COMPREHEN METABOLIC PANEL: CPT

## 2019-05-10 PROCEDURE — 36415 COLL VENOUS BLD VENIPUNCTURE: CPT

## 2019-05-10 NOTE — DISCHARGE SUMMARY
[address]     Time In: 0800  Time Out: 9370  Minutes: 39  Timed Code Treatment Minutes: 39 Minutes    Date: 5/10/2019  Patient Name: Lisbeth Patten,  Gender:  female        CSN: [id number]   : 1960  (62 y.o.)       Referring Practitioner: Margie Barnes MD      Diagnosis: M25.551- Pain in right hip; M70.61- Trochanteric bursitis, right hip; M16.11 - Unilateral primary osteoarthritis, right hip  Treatment Diagnosis: acute right hip pain, right hip weakness, gait abnormality   Additional Pertinent Hx: PMH: HTN, incontinence, diabetes, arthritis. General:  PT Visit Information  Onset Date: 04/15/19  PT Insurance Information: [employer]- no visit limit, aquatics and modalities covered, NO massage  Total # of Visits to Date: 8  Plan of Care/Certification Expiration Date: 06/10/19  Progress Note Counter: 8/10 for PN. Subjective:  Chart Reviewed: Yes  Comments: Follow up with Dr. Aisha Duane in 3-4 weeks if hip doesn't feel better for possible injections. XR findings: right hip OA and bursitis. Subjective: Pt reports hip is feeling better. Pt only has pain when she gets out of bed in the mornings. Pt reports she had to get up multiple times throughout the night to walk around last night. Pt reports she has been seeing the chiropractor and that is helping align hips. Pt feels maybe a cortisone injection would be beneficial.      Pain:  Patient Currently in Pain: No         Objective         Exercises  Exercise 2: R hip flexor stretch, and right hamstring at step 15 seconds x 3  Exercise 3: Nustep level 3 seat8 UE 9 for 5 min.    Exercise 4: 3 way hip with peach band x 15  Exercise 5: step ups on 6 in. step forward/lateral/retro R CC X15  Exercise 6: rockerboard forward/lateral taps X 20   with 30 balance min hand taps  Exercise 7: R Knee flexion/extension at NK table 5# x15  Exercise 8: Supine SLR 10 x 5 seconds R, side lying hip abduction 10x5 sec hold, clam shell, reverse clam shell 10x5 sec hold  Exercise 9: bridge 10x 5 seconds         Activity Tolerance:  Activity Tolerance: Patient Tolerated treatment well  Activity Tolerance: No pain at end of session. Assessment: Body structures, Functions, Activity limitations: Decreased functional mobility , Decreased strength, Decreased ROM, Decreased balance, Decreased ADL status  Assessment: Pt demos good progress toward goals, meeting all but pain goal. However, pain mainly present after sleeping all night but loosens up as she moves. No additional PT warranted at this time. Prognosis: Good       Patient Education:  Patient Education: Continue with HEP at least 4x/wk. Follow up with physician. Plan:  Plan Comment: Discharge POC. Goals:  Patient goals : Less right hip pain    Short term goals  Time Frame for Short term goals: 4 weeks  Short term goal 1: Pt will report <5/10 right hip pain at worst to tolerate prolonged sitting and laying down. NOT MET- Pt reports pain is 7-8/10 first thing in the morning but subsides with movement. Short term goal 2: Pt will demo minimal to no right hip AROM restriction to improve flexibility for symptom control and strengthening. MET- Pt demos minimal hip IR, ER and flexion restriction but no pain. Short term goal 3: Pt will improve R hip strength to 4+/5 to negotiate stairs reciprocally at home with 1 handrail. MET- right hip 4+/5 without pain, negotiates 4 steps reciprocally without deviations with 1 handrail- pt typically descend nonreciprocally for safety. Short term goal 4: Pt will ambulate without antalgic pattern community distances on various terrain. MET- Pt ambulates without antalgia, mild Trendelenberg but reports hips have been malaligned until adjusted by chiropractor.      Long term goals  Time Frame for Long term goals : 8 weeks  Long term goal 1: Pt will be independent and compliant with HEP to achieve above goals. MET- Pt verbalizes compliance with HEP daily.      Binh Lezama PT

## 2020-03-13 ENCOUNTER — HOSPITAL ENCOUNTER (OUTPATIENT)
Age: 60
Discharge: HOME | End: 2020-03-13
Payer: COMMERCIAL

## 2020-03-13 LAB
ALBUMIN SERPL-MCNC: 4.4 G/DL (ref 3.5–5.1)
ALP BLD-CCNC: 417 U/L (ref 38–126)
ALT SERPL-CCNC: 73 U/L (ref 11–66)
AST SERPL-CCNC: 65 U/L (ref 5–40)
BILIRUB SERPL-MCNC: 0.6 MG/DL (ref 0.3–1.2)
BILIRUBIN DIRECT: < 0.2 MG/DL (ref 0–0.3)
TOTAL PROTEIN: 8.3 G/DL (ref 6.1–8)

## 2020-03-13 PROCEDURE — 36415 COLL VENOUS BLD VENIPUNCTURE: CPT

## 2020-03-13 PROCEDURE — 80076 HEPATIC FUNCTION PANEL: CPT

## 2020-05-26 ENCOUNTER — HOSPITAL ENCOUNTER (OUTPATIENT)
Age: 60
Discharge: HOME | End: 2020-05-26
Payer: COMMERCIAL

## 2020-05-26 LAB
ALBUMIN SERPL-MCNC: 4 G/DL (ref 3.5–5.1)
ALP BLD-CCNC: 558 U/L (ref 38–126)
ALT SERPL-CCNC: 71 U/L (ref 11–66)
ANION GAP SERPL CALCULATED.3IONS-SCNC: 10 MEQ/L (ref 8–16)
AST SERPL-CCNC: 67 U/L (ref 5–40)
AVERAGE GLUCOSE: 90 MG/DL (ref 70–126)
BASOPHILS # BLD: 0.6 %
BASOPHILS ABSOLUTE: 0.1 THOU/MM3 (ref 0–0.1)
BILIRUB SERPL-MCNC: 0.4 MG/DL (ref 0.3–1.2)
BILIRUBIN DIRECT: < 0.2 MG/DL (ref 0–0.3)
BUN BLDV-MCNC: 10 MG/DL (ref 7–22)
CALCIUM SERPL-MCNC: 9.6 MG/DL (ref 8.5–10.5)
CHLORIDE BLD-SCNC: 105 MEQ/L (ref 98–111)
CHOLESTEROL, TOTAL: 242 MG/DL (ref 100–199)
CO2: 28 MEQ/L (ref 23–33)
CREAT SERPL-MCNC: 0.7 MG/DL (ref 0.4–1.2)
CREATININE URINE: 251.6 MG/DL
EOSINOPHIL # BLD: 5.9 %
EOSINOPHILS ABSOLUTE: 0.6 THOU/MM3 (ref 0–0.4)
ERYTHROCYTE [DISTWIDTH] IN BLOOD BY AUTOMATED COUNT: 14.6 % (ref 11.5–14.5)
ERYTHROCYTE [DISTWIDTH] IN BLOOD BY AUTOMATED COUNT: 50.4 FL (ref 35–45)
GFR SERPL CREATININE-BSD FRML MDRD: 85 ML/MIN/1.73M2
GLUCOSE BLD-MCNC: 106 MG/DL (ref 70–108)
HBA1C MFR BLD: 5 % (ref 4.4–6.4)
HCT VFR BLD CALC: 43 % (ref 37–47)
HDLC SERPL-MCNC: 55 MG/DL
HEMOGLOBIN: 13.5 GM/DL (ref 12–16)
IMMATURE GRANS (ABS): 0.05 THOU/MM3 (ref 0–0.07)
IMMATURE GRANULOCYTES: 0.5 %
LDL CHOLESTEROL CALCULATED: 157 MG/DL
LYMPHOCYTES # BLD: 21.2 %
LYMPHOCYTES ABSOLUTE: 2.2 THOU/MM3 (ref 1–4.8)
MCH RBC QN AUTO: 29.9 PG (ref 26–33)
MCHC RBC AUTO-ENTMCNC: 31.4 GM/DL (ref 32.2–35.5)
MCV RBC AUTO: 95.3 FL (ref 81–99)
MONOCYTES # BLD: 6.5 %
MONOCYTES ABSOLUTE: 0.7 THOU/MM3 (ref 0.4–1.3)
NUCLEATED RED BLOOD CELLS: 0 /100 WBC
PLATELET # BLD: 265 THOU/MM3 (ref 130–400)
PMV BLD AUTO: 10.4 FL (ref 9.4–12.4)
POTASSIUM SERPL-SCNC: 4 MEQ/L (ref 3.5–5.2)
PROT/CREAT RATIO, UR: 0.09
PROTEIN, URINE: 23.9 MG/DL
RBC # BLD: 4.51 MILL/MM3 (ref 4.2–5.4)
SEG NEUTROPHILS: 65.3 %
SEGMENTED NEUTROPHILS ABSOLUTE COUNT: 6.7 THOU/MM3 (ref 1.8–7.7)
SODIUM BLD-SCNC: 143 MEQ/L (ref 135–145)
T4 FREE: 0.89 NG/DL (ref 0.93–1.76)
TOTAL PROTEIN: 7.2 G/DL (ref 6.1–8)
TRIGL SERPL-MCNC: 149 MG/DL (ref 0–199)
TSH SERPL DL<=0.05 MIU/L-ACNC: 2.85 UIU/ML (ref 0.4–4.2)
WBC # BLD: 10.2 THOU/MM3 (ref 4.8–10.8)

## 2020-05-26 PROCEDURE — 84443 ASSAY THYROID STIM HORMONE: CPT

## 2020-05-26 PROCEDURE — 80053 COMPREHEN METABOLIC PANEL: CPT

## 2020-05-26 PROCEDURE — 83036 HEMOGLOBIN GLYCOSYLATED A1C: CPT

## 2020-05-26 PROCEDURE — 36415 COLL VENOUS BLD VENIPUNCTURE: CPT

## 2020-05-26 PROCEDURE — 82248 BILIRUBIN DIRECT: CPT

## 2020-05-26 PROCEDURE — 85025 COMPLETE CBC W/AUTO DIFF WBC: CPT

## 2020-05-26 PROCEDURE — 84439 ASSAY OF FREE THYROXINE: CPT

## 2020-05-26 PROCEDURE — 80061 LIPID PANEL: CPT

## 2020-05-26 PROCEDURE — 84156 ASSAY OF PROTEIN URINE: CPT

## 2020-05-26 PROCEDURE — 82570 ASSAY OF URINE CREATININE: CPT

## 2020-09-18 ENCOUNTER — OFFICE VISIT (OUTPATIENT)
Dept: BARIATRICS/WEIGHT MGMT | Age: 60
End: 2020-09-18
Payer: COMMERCIAL

## 2020-09-18 VITALS
HEART RATE: 72 BPM | WEIGHT: 197.6 LBS | RESPIRATION RATE: 18 BRPM | BODY MASS INDEX: 36.36 KG/M2 | SYSTOLIC BLOOD PRESSURE: 114 MMHG | TEMPERATURE: 98.1 F | DIASTOLIC BLOOD PRESSURE: 78 MMHG | HEIGHT: 62 IN

## 2020-09-18 PROCEDURE — 2022F DILAT RTA XM EVC RTNOPTHY: CPT | Performed by: SURGERY

## 2020-09-18 PROCEDURE — G8427 DOCREV CUR MEDS BY ELIG CLIN: HCPCS | Performed by: SURGERY

## 2020-09-18 PROCEDURE — G8417 CALC BMI ABV UP PARAM F/U: HCPCS | Performed by: SURGERY

## 2020-09-18 PROCEDURE — 1036F TOBACCO NON-USER: CPT | Performed by: SURGERY

## 2020-09-18 PROCEDURE — 3044F HG A1C LEVEL LT 7.0%: CPT | Performed by: SURGERY

## 2020-09-18 PROCEDURE — 99203 OFFICE O/P NEW LOW 30 MIN: CPT | Performed by: SURGERY

## 2020-09-18 PROCEDURE — 3017F COLORECTAL CA SCREEN DOC REV: CPT | Performed by: SURGERY

## 2020-09-18 RX ORDER — FLUOXETINE 10 MG/1
10 CAPSULE ORAL DAILY
COMMUNITY

## 2020-09-19 ASSESSMENT — ENCOUNTER SYMPTOMS
SINUS PRESSURE: 0
ANAL BLEEDING: 0
VOMITING: 0
ABDOMINAL PAIN: 0
SORE THROAT: 0
NAUSEA: 0
ABDOMINAL DISTENTION: 0
BLOOD IN STOOL: 0
COUGH: 0
WHEEZING: 0
SHORTNESS OF BREATH: 0
BACK PAIN: 0
EYE DISCHARGE: 0
RECTAL PAIN: 0
CHEST TIGHTNESS: 0
PHOTOPHOBIA: 0
ALLERGIC/IMMUNOLOGIC NEGATIVE: 1
VOICE CHANGE: 0
APNEA: 0
EYE ITCHING: 0
RHINORRHEA: 0
EYE REDNESS: 0
EYE PAIN: 0
FACIAL SWELLING: 0
CHOKING: 0
CONSTIPATION: 0
COLOR CHANGE: 0
TROUBLE SWALLOWING: 0
DIARRHEA: 0
STRIDOR: 0

## 2020-09-19 NOTE — PROGRESS NOTES
sore throat, tinnitus, trouble swallowing and voice change. Eyes: Negative for photophobia, pain, discharge, redness, itching and visual disturbance. Respiratory: Negative for apnea, cough, choking, chest tightness, shortness of breath, wheezing and stridor. Cardiovascular: Negative for chest pain, palpitations and leg swelling. Gastrointestinal: Negative for abdominal distention, abdominal pain, anal bleeding, blood in stool, constipation, diarrhea, nausea, rectal pain and vomiting. Endocrine: Negative. Genitourinary: Negative for decreased urine volume, difficulty urinating, dyspareunia, dysuria, enuresis, flank pain, frequency, genital sores, hematuria, menstrual problem, pelvic pain, urgency, vaginal bleeding, vaginal discharge and vaginal pain. Musculoskeletal: Negative for arthralgias, back pain, gait problem, joint swelling, myalgias, neck pain and neck stiffness. Skin: Negative for color change, pallor, rash and wound. Allergic/Immunologic: Negative. Neurological: Negative for dizziness, tremors, seizures, syncope, facial asymmetry, speech difficulty, weakness, light-headedness, numbness and headaches. Hematological: Negative for adenopathy. Does not bruise/bleed easily. Psychiatric/Behavioral: Positive for sleep disturbance. Negative for agitation, behavioral problems, confusion, decreased concentration, dysphoric mood, hallucinations, self-injury and suicidal ideas. The patient is nervous/anxious. The patient is not hyperactive.         Past Medical History:   Diagnosis Date    Arthritis     [facility] [address]     Depression     Diabetes mellitus [facility] [address]     Fatty liver     GERD (gastroesophageal reflux disease)     Hypertension     Incontinence     Liver disease     AIH with PBC       Past Surgical History:   Procedure Laterality Date    BUNIONECTOMY      BUNIONECTOMY      CARPAL TUNNEL RELEASE Right     COLONOSCOPY      FINGER TRIGGER RELEASE Right     HEEL SPUR SURGERY      HYSTERECTOMY      INCONTINENCE SURGERY      LIVER BIOPSY      SC EGD TRANSORAL BIOPSY SINGLE/MULTIPLE N/A 9/6/2017    EGD BIOPSY performed by Clarence Simmons MD at [address] [facility]  2017       Current Outpatient Medications   Medication Sig Dispense Refill    FLUoxetine (PROZAC) 10 MG capsule Take 10 mg by mouth daily      ursodiol (ACTIGALL) 500 MG tablet Take 0.5 tablets by mouth 3 times daily (Patient taking differently: Take 250 mg by mouth 2 times daily ) 45 tablet 3    budesonide (ENTOCORT EC) 3 MG extended release capsule Take 2 capsules by mouth every morning (Patient taking differently: Take 6 mg by mouth 3 times daily ) 180 capsule 1    omeprazole (PRILOSEC) 40 MG delayed release capsule Take 40 mg by mouth as needed      metFORMIN (GLUCOPHAGE) 500 MG tablet Take 1,000 mg by mouth 2 times daily (with meals)       lisinopril (PRINIVIL;ZESTRIL) 20 MG tablet Take 20 mg by mouth daily. No current facility-administered medications for this visit.         Allergies   Allergen Reactions    Erythromycin        Family History   Problem Relation Age of Onset    Liver Disease Father     Heart Disease Father     Emphysema Father     Stroke Father     Other Mother         Car Accident    Arthritis Brother     Heart Disease Brother     Heart Disease Paternal Grandfather     Diabetes Paternal Grandfather     Hypertension Paternal Grandfather     Stroke Paternal Grandfather     Heart Disease Paternal Grandmother     Hypertension Paternal Grandmother     Stroke Paternal Grandmother     Heart Disease Maternal Grandfather     Heart Disease Maternal Grandmother     Colon Cancer Neg Hx        Social History     Socioeconomic History    Marital status:      Spouse name: Not on file    Number of children: 2    Years of education: Not on file    Highest education level: Not on file   Occupational History    Not on file   Social Needs    Financial resource strain: Not on file    Food insecurity     Worry: Not on file     Inability: Not on file    Transportation needs     Medical: Not on file     Non-medical: Not on file   Tobacco Use    Smoking status: Never Smoker    Smokeless tobacco: Never Used   Substance and Sexual Activity    Alcohol use: No    Drug use: No    Sexual activity: Not Currently   Lifestyle    Physical activity     Days per week: Not on file     Minutes per session: Not on file    Stress: Not on file   Relationships    Social connections     Talks on phone: Not on file     Gets together: Not on file     Attends Mosque service: Not on file     Active member of club or organization: Not on file     Attends meetings of clubs or organizations: Not on file     Relationship status: Not on file    Intimate partner violence     Fear of current or ex partner: Not on file     Emotionally abused: Not on file     Physically abused: Not on file     Forced sexual activity: Not on file   Other Topics Concern    Not on file   Social History Narrative    Not on file     Vitals:    09/18/20 1401   BP: 114/78   Pulse: 72   Resp: 18   Temp: 98.1 °F (36.7 °C)     Body mass index is 36.14 kg/m². Wt Readings from Last 3 Encounters:   09/18/20 197 lb 9.6 oz (89.6 kg)   05/17/19 213 lb 6.4 oz (96.8 kg)   04/15/19 206 lb (93.4 kg)     Objective:   Physical Exam  Vitals signs reviewed. Constitutional:       General: She is not in acute distress. Appearance: She is well-developed. She is not diaphoretic. HENT:      Head: Normocephalic and atraumatic. Right Ear: External ear normal.      Left Ear: External ear normal.      Nose: Nose normal.   Eyes:      General: No scleral icterus. Right eye: No discharge. Left eye: No discharge. Conjunctiva/sclera: Conjunctivae normal.   Neck:      Musculoskeletal: Normal range of motion and neck supple.    Cardiovascular:      Rate and Rhythm: Normal rate and regular rhythm. Heart sounds: Normal heart sounds. Pulmonary:      Effort: Pulmonary effort is normal. No respiratory distress. Breath sounds: Normal breath sounds. No wheezing or rales. Chest:      Chest wall: No tenderness. Abdominal:      General: Bowel sounds are normal. There is no distension. Palpations: Abdomen is soft. There is no mass. Tenderness: There is no abdominal tenderness. There is no guarding or rebound. Musculoskeletal: Normal range of motion. General: No tenderness. Skin:     General: Skin is warm and dry. Coloration: Skin is not pale. Findings: No erythema or rash. Neurological:      Mental Status: She is alert and oriented to person, place, and time. Cranial Nerves: No cranial nerve deficit. Psychiatric:         Behavior: Behavior normal.         Thought Content:  Thought content normal.         Judgment: Judgment normal.       CBC   Lab Results   Component Value Date    WBC 10.2 05/26/2020    RBC 4.51 05/26/2020    RBC 4.36 08/01/2011    HGB 13.5 05/26/2020    HCT 43.0 05/26/2020    MCV 95.3 05/26/2020    MCH 29.9 05/26/2020    MCHC 31.4 05/26/2020    RDW 13.9 10/05/2017     05/26/2020    MPV 10.4 05/26/2020    RBCMORP NORMAL 10/05/2017    SEGSPCT 65.3 05/26/2020    LABLYMP 21.2 05/26/2020    LABLYMP 27.8 08/01/2011    MONOPCT 6.5 05/26/2020    LABEOS 5.9 05/26/2020    BASO 0.6 05/26/2020    NRBC 0 05/26/2020    NRBC 0 08/01/2011    ANISOCYTOSIS 1 05/09/2014    SEGSABS 6.7 05/26/2020    LYMPHSABS 2.2 05/26/2020    MONOSABS 0.7 05/26/2020    EOSABS 0.6 05/26/2020    BASOSABS 0.1 05/26/2020      BMP/CMP   Lab Results   Component Value Date    GLUCOSE 106 05/26/2020    GLUCOSE 106 12/23/2011    CREATININE 0.7 05/26/2020    BUN 10 05/26/2020     05/26/2020    K 4.0 05/26/2020     05/26/2020    CO2 28 05/26/2020    CALCIUM 9.6 05/26/2020    AST 67 05/26/2020    ALKPHOS 558 05/26/2020    PROT 7.2 05/26/2020 LABALBU 4.0 05/26/2020    LABALBU 4.5 08/01/2011    BILITOT 0.4 05/26/2020    ALT 71 05/26/2020      PREALBUMIN   No results found for: PREALBUMIN   VITAMIN B12   No results found for: [id number]   VITAMIN D   No results found for: VITD25   PTH  No results found for: IPTH  VITAMIN B1/ THIAMINE   No results found for: [id number]   LIPID SCREEN (FASTING)   Lab Results   Component Value Date    CHOL 242 05/26/2020    TRIG 149 05/26/2020    HDL 55 05/26/2020    LDLCALC 157 05/26/2020   ,   HGA1C (T2DM ONLY)   Lab Results   Component Value Date    LABA1C 5.0 05/26/2020    AVGG 90 05/26/2020      TSH   Lab Results   Component Value Date    TSH 2.850 05/26/2020      IRON   Lab Results   Component Value Date    IRON 82 07/27/2018      TIBC  No results found for: TIBC  FERRITIN  Lab Results   Component Value Date    FERRITIN 197 05/10/2019     VITAMIN A  No results found for: RETINOL  NICOTINE  No results found for: NMET  UDS  No results found for: UDP  PSA  No results found for: LABPSA  GFR  Lab Results   Component Value Date    LABGLOM 85 05/26/2020     DEXA  No results found for this or any previous visit. Imaging - none    Patient Active Problem List   Diagnosis    Epigastric abdominal pain    High anion gap metabolic acidosis    Essential hypertension    Type 2 diabetes mellitus without complication (HCC)    Elevated LFTs     Assessment:      1. Obesity (BMI 36)  2. Diabetes mellitus  3. Anxiety  4. Depression  5. Autoimmune hepatitis  6. Fatty liver disease  7. Urinary incontinence  8. GERD  9. Hypertension      Plan:      1. Long discussion about the pros and cons of weight loss surgery. The risks benefits and alternatives to laparoscopic adjustable band, gastric sleeve and gastric Jaycee-en-Y bypass were discussed in detail. The pros and cons of robotic assisted, laparoscopic and open techniques were discussed. 2.  Behavior modification discussed in detail in regards to dietary habits.   3. Nutritional education occurred during visit. Will set up a consultation/evaluation with dietitian for further evaluation. 4.  Options for medical management of morbid obesity discussed. 5.  Improvement in fitness/exercise discussed with patient and the need for this with/without surgery. 6.  Obtain medical necessity letter from PCP as needed. 7.  Follow-up in one month at weight management program at [facility]. 8.  Signs and symptoms reviewed with patient that would be concerning and need her to return to office for re-evaluation. Patient states she will call if she has questions or concerns. 9. Multivitamin  10. Psychology evaluation  11. EGD prior to any surgical intervention  12. Encouraged support groups  13. Encouraged Naturally Slim/Rev It Up  14. Discussed the pros and cons along with possible side effects/complications of weight loss medications. All questions answered. Patient states that if she is not able to lose enough adequate excess body weight with medical management only then she would be like discussed weight loss surgical intervention such as sleeve gastrectomy/gastric bypass for further weight loss. More than 30 minutes spent with patient today. Greater than 50% of the time was involved counseling, educaton and coordinating care.             Pk Dumont MD

## 2021-03-26 ENCOUNTER — HOSPITAL ENCOUNTER (OUTPATIENT)
Dept: OCCUPATIONAL THERAPY | Age: 61
Setting detail: THERAPIES SERIES
Discharge: HOME | End: 2021-03-26
Payer: COMMERCIAL

## 2021-03-26 PROCEDURE — 97165 OT EVAL LOW COMPLEX 30 MIN: CPT

## 2021-03-26 PROCEDURE — 97022 WHIRLPOOL THERAPY: CPT

## 2021-03-26 NOTE — FLOWSHEET NOTE
** PLEASE SIGN, DATE AND TIME CERTIFICATION BELOW AND RETURN TO [facility] (FAX #: [phone]). ATTEST/CO-SIGN IF ACCESSING VIA [employer]. THANK YOU.**    I certify that I have examined the patient below and determined that Physical Medicine and Rehabilitation service is necessary and that I approve the established plan of care for up to 90 days or as specifically noted. Attestation, signature or co-signature of physician indicates approval of certification requirements.    ________________________ ____________ __________  Physician Signature   Date   Time   [address] THERAPY  [x] EVALUATION  [] DAILY NOTE (LAND) [] DAILY NOTE (AQUATIC ) [] PROGRESS NOTE [] DISCHARGE NOTE    [x] [facility]   [] [address]    [] [facility]   [] [facility]    Date: 3/26/2021  Patient Name:  Mana Shafer  : 1960  MRN: [id number]  CSN: [id number]    Referring Practitioner VIVIEN Llody -*   Diagnosis Lesion of ulnar nerve, right upper limb [G56.21]    Treatment Diagnosis Pain in the wrist, decreased ADLs   Date of Evaluation 3/26/21      Functional Outcome Measure Used UEFS   Functional Outcome Score 44 (3/26/21)       Insurance: Primary: Payor: [address] [place] [address] [address] /  /  / ,   Secondary:    Authorization Information: No ionto or hot/cold packs   Visit # 1, 1/10 for progress note   Visits Allowed: 30   Recertification Date:    Physician Follow-Up: 21   Physician Orders: eval and treat at therapists discretion   Pertinent History: Patient reports that her pain began at the end of February of this year, after returning to work at [employer]. Reports that she was there approximately 3 weeks then the pain began. Reports previous carpal tunnel and trigger finger surgery on the right in either 2016 or 2017. SUBJECTIVE: pleasant and cooperative.   Reports that she has been wearing the brace for about 2 weeks. Reports that she has not been wearing her brace at work. Reports that she does have pain at work. Social/Functional History:  Medications and Allergies have been reviewed and are listed on the [address] lives alone . 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 Assist with dressing, showers. OBJECTIVE:  Hand Dominance right handed   Palpation Tender to palpation, reports painful. 10/10 pain with touch   Observation Into the clinic wearing a wrist cock up splint off of the shelf. Reports that she wears mostly at night. Edema Wrist crease right 16.5 cm, wrist crease left 16.6 cm   Special tests Able to perform wrist ulnar deviation without wrist extension       ADL's At this time patient reports increased difficulty with housework, lifting a bag of groceries, preparing food, driving, dressing, buttoning, using tools or appliances, opening doors, sleeping. Sensation Reports tingling when palpating at the base of the 5th digit   Coordination 9 Hole Peg Test:   Right: 18 seconds     Left:   18 seconds.            RIGHT UPPER EXTREMITY  RANGE OF MOTION    AROM PROM COMMENTS         Shoulder Flexion      Shoulder Extension      Shoulder Abduction      Shoulder Adduction      Shoulder External Rotation      Shoulder Internal Rotation      Shoulder Range of Motion is [place] [place]  [x]      Elbow Flexion      Elbow Extension      Forearm Pronation      Forearm Supination      Elbow Range of Motion is [place] [place]  [x]      Wrist Flexion 65 75    Wrist Extension 35 55    Wrist Radial Deviation 15     Wrist Ulnar Deviation 15 30    Wrist Range of Motion is WFL  []   If no measurement is recorded, no formal assessment was completed for that motion. Right Left    Strength Settin 35 38   Pinch Strength Tip Pinch: 8 7    Lateral Pinch 12 12    3 jaw evelyne 6 7   If no ratings are recorded, no formal assessment was completed. TREATMENT   Precautions: None per patient    Pain:  6/10  More pain with repetitious movement verses lifting    X in shaded column indicates Activity Completed Today   Modalities Parameters/  Location  Notes/Comments   fluidotherapy  Right hand and wrist x 10 minutes - reported decreased pain after               Manual Therapy Time/  Technique  Notes/Comments   IASTM and STM dorsal and volar forearm to decrease tightness and associated pain   Next session               Exercises   Sets/  Sec Reps  Notes/Comments   Ulnar nerve glide   x    PROM wrist flexion and extension, UD    Next session                                      Activities Time    Notes/Comments   Education to increase brace wear to include any activity that aggravates pain  x                  Specific Interventions Next Treatment: fluido, US, ROM, strengthening, decrease sensitivity    Activity/Treatment Tolerance:  [x]  Patient tolerated treatment well  []  Patient limited by fatigue  []  Patient limited by pain   []  Patient limited by other medical complications  []  Other:     Assessment: Patient presents to the clinic this date with pain in the right wrist, decreased ROM and decreased strength. Patient reports increased difficulty with performance with house chores and self care activity. Patient requires skilled therapy at this time to address limitations in order to return to previous functional level. Areas for Improvement: impaired ROM, impaired sensation, impaired strength and pain  Prognosis: good    GOALS:  Patient Goal: no pain    Short Term Goals:  Time Frame: 4 weeks  *  Patient will increase right wrist flexion to 75 degrees, extension to 50 degrees and UD to 25 degrees to increase ease and ability for self care tasks.    * Patient will increase right  to 40# to increase ability to perform house keeping tasks and food prep. *  Patient will report pain with activity no greater than 3/10 to increase ease and ability for buttoning and dressing. *  Patient will be independent with HEP to increase ease and ability sleep. Long Term Goals:  Time Frame: 8 weeks  *  Patient will improve UEFS to at least 60 to demonstrate increased ability to perform ADLs and IADLs. Patient Education:   [x]  HEP/Education Completed: Plan of Care, Goals.   HEP: encouraged to wear splint during aggravating activity, ulnar nerve glide  []  No new Education completed  []  Reviewed Prior HEP      [x]  Patient verbalized and/or demonstrated understanding of education provided. []  Patient unable to verbalize and/or demonstrate understanding of education provided. Will continue education. []  Barriers to learning: none    PLAN:  Treatment Recommendations: Strengthening, Range of Motion, Manual Therapy - Soft Tissue Mobilization, Manual Therapy - Joint Manipulation, Pain Management, Home Exercise Program and Modalities    [x]  Plan of care initiated. Plan to see patient 2 times per week for 8 weeks to address the treatment planned outlined above.   []  Continue with current plan of care  []  Modify plan of care as follows:    []  Hold pending physician visit  []  Discharge    Time In 1145   Time Out 1235   Timed Code Minutes: 0 min   Total Treatment Time: 60 min       Electronically Signed by: CLEMENTE Monreal, OTR/L [id number]

## 2021-04-01 ENCOUNTER — HOSPITAL ENCOUNTER (OUTPATIENT)
Dept: OCCUPATIONAL THERAPY | Age: 61
Setting detail: THERAPIES SERIES
Discharge: HOME | End: 2021-04-01
Payer: COMMERCIAL

## 2021-04-01 PROCEDURE — 97110 THERAPEUTIC EXERCISES: CPT

## 2021-04-01 PROCEDURE — 97022 WHIRLPOOL THERAPY: CPT

## 2021-04-01 NOTE — PROGRESS NOTES
[address] THERAPY  [] EVALUATION  [x] DAILY NOTE (LAND) [] DAILY NOTE (AQUATIC )   [] PROGRESS NOTE [] DISCHARGE NOTE    [x] OUTPATIENT REHABILITATION CENTER [facility]   [] [place] [address]    [] [facility]   [] Bertrand Smith    Date: 2021  Patient Name:  Amelia Garzon  : 1960  MRN: [id number]  CSN: [id number]    Referring Practitioner VIVIEN Mcintyre -*   Diagnosis Lesion of ulnar nerve, right upper limb [G56.21]    Treatment Diagnosis Pain in the wrist, decreased ADLs   Date of Evaluation 3/26/21      Functional Outcome Measure Used UEFS   Functional Outcome Score 44 (3/26/21)       Insurance: Primary: Payor: [address] [place] [address] [address] /  /  / ,   Secondary:    Authorization Information: No ionto or hot/cold packs   Visit # 2, 2/10 for progress note   Visits Allowed: 30   Recertification Date: 70   Physician Follow-Up: 21   Physician Orders: eval and treat at therapists discretion   Pertinent History: Patient reports that her pain began at the end of February of this year, after returning to work at [employer]. Reports that she was there approximately 3 weeks then the pain began. Reports previous carpal tunnel and trigger finger surgery on the right in either  or . SUBJECTIVE: Patient reports no change since initial eval. Is not wearing the brace today.         TREATMENT   Precautions: None per patient    Pain:  6/10  More pain with repetitious movement verses lifting    X in shaded column indicates Activity Completed Today   Modalities Parameters/  Location  Notes/Comments   fluidotherapy  Right hand and wrist x 15 minutes                Manual Therapy Time/  Technique  Notes/Comments   IASTM and STM dorsal and volar forearm to decrease tightness and associated pain  x    STM to R upper trap to decrease tightness  x          Exercises   Sets/  Sec Reps  Notes/Comments   Ulnar nerve glide   x    PROM wrist flexion and extension, UD   x    Prayer stretch 10 sec 10 x    Upper trap stretch 10 sec 5 x                         Activities Time    Notes/Comments   Education to increase brace wear to include any activity that aggravates pain                    Specific Interventions Next Treatment: fluido, US, ROM, strengthening, decrease sensitivity    Activity/Treatment Tolerance:  [x]  Patient tolerated treatment well  []  Patient limited by fatigue  []  Patient limited by pain   []  Patient limited by other medical complications  []  Other:     Assessment: Patient is progressing towards goals. Areas for Improvement: impaired ROM, impaired sensation, impaired strength and pain  Prognosis: good    GOALS:  Patient Goal: no pain    Short Term Goals:  Time Frame: 4 weeks  *  Patient will increase right wrist flexion to 75 degrees, extension to 50 degrees and UD to 25 degrees to increase ease and ability for self care tasks. *  Patient will increase right  to 40# to increase ability to perform house keeping tasks and food prep. *  Patient will report pain with activity no greater than 3/10 to increase ease and ability for buttoning and dressing. *  Patient will be independent with HEP to increase ease and ability sleep. Long Term Goals:  Time Frame: 8 weeks  *  Patient will improve UEFS to at least 60 to demonstrate increased ability to perform ADLs and IADLs. Patient Education:   []  HEP/Education Completed: Plan of Care, Goals.   HEP: encouraged to wear splint during aggravating activity, ulnar nerve glide  [x]  No new Education completed  []  Reviewed Prior HEP      [x]  Patient verbalized and/or demonstrated understanding of education provided. []  Patient unable to verbalize and/or demonstrate understanding of education provided. Will continue education.   [x]  Barriers to learning: none    PLAN:  Treatment Recommendations: Strengthening, Range of Motion, Manual Therapy - Soft Tissue Mobilization, Manual Therapy - Joint Manipulation, Pain Management, Home Exercise Program and Modalities    []  Plan of care initiated. Plan to see patient 2 times per week for 8 weeks to address the treatment planned outlined above.   [x]  Continue with current plan of care  []  Modify plan of care as follows:    []  Hold pending physician visit  []  Discharge    Time In 1135   Time Out 1213   Timed Code Minutes: 23 min   Total Treatment Time: 38 min       TAD OLIVAREZ/L #[id number]

## 2021-04-07 ENCOUNTER — APPOINTMENT (OUTPATIENT)
Dept: OCCUPATIONAL THERAPY | Age: 61
End: 2021-04-07
Payer: COMMERCIAL

## 2021-04-15 ENCOUNTER — APPOINTMENT (OUTPATIENT)
Dept: OCCUPATIONAL THERAPY | Age: 61
End: 2021-04-15
Payer: COMMERCIAL

## 2021-04-20 ENCOUNTER — APPOINTMENT (OUTPATIENT)
Dept: OCCUPATIONAL THERAPY | Age: 61
End: 2021-04-20
Payer: COMMERCIAL

## 2021-04-22 ENCOUNTER — APPOINTMENT (OUTPATIENT)
Dept: OCCUPATIONAL THERAPY | Age: 61
End: 2021-04-22
Payer: COMMERCIAL

## 2021-04-29 ENCOUNTER — PROCEDURE VISIT (OUTPATIENT)
Dept: NEUROLOGY | Age: 61
End: 2021-04-29
Payer: COMMERCIAL

## 2021-04-29 DIAGNOSIS — R20.0 RIGHT ARM NUMBNESS: Primary | ICD-10-CM

## 2021-04-29 DIAGNOSIS — M54.12 CERVICAL RADICULOPATHY: ICD-10-CM

## 2021-04-29 PROCEDURE — 95911 NRV CNDJ TEST 9-10 STUDIES: CPT | Performed by: PSYCHIATRY & NEUROLOGY

## 2021-04-29 PROCEDURE — 95886 MUSC TEST DONE W/N TEST COMP: CPT | Performed by: PSYCHIATRY & NEUROLOGY

## 2021-05-25 ENCOUNTER — HOSPITAL ENCOUNTER (OUTPATIENT)
Dept: MRI IMAGING | Age: 61
Discharge: HOME | End: 2021-05-25
Payer: COMMERCIAL

## 2021-05-25 DIAGNOSIS — G56.21 ULNAR NEURITIS, RIGHT: ICD-10-CM

## 2021-05-25 DIAGNOSIS — M50.30 DEGENERATION, INTERVERTEBRAL DISC, CERVICAL: ICD-10-CM

## 2021-05-25 DIAGNOSIS — M54.12 CERVICAL RADICULITIS: ICD-10-CM

## 2021-05-25 PROCEDURE — 72141 MRI NECK SPINE W/O DYE: CPT

## 2022-04-23 ENCOUNTER — HOSPITAL ENCOUNTER (EMERGENCY)
Age: 62
Discharge: HOME | End: 2022-04-23
Attending: FAMILY MEDICINE
Payer: COMMERCIAL

## 2022-04-23 ENCOUNTER — APPOINTMENT (OUTPATIENT)
Dept: GENERAL RADIOLOGY | Age: 62
End: 2022-04-23
Payer: COMMERCIAL

## 2022-04-23 VITALS
DIASTOLIC BLOOD PRESSURE: 88 MMHG | RESPIRATION RATE: 17 BRPM | TEMPERATURE: 98.3 F | SYSTOLIC BLOOD PRESSURE: 121 MMHG | OXYGEN SATURATION: 98 % | HEART RATE: 87 BPM

## 2022-04-23 DIAGNOSIS — S20.221A BACK CONTUSION, RIGHT, INITIAL ENCOUNTER: Primary | ICD-10-CM

## 2022-04-23 PROCEDURE — 72100 X-RAY EXAM L-S SPINE 2/3 VWS: CPT

## 2022-04-23 PROCEDURE — 6360000002 HC RX W HCPCS: Performed by: FAMILY MEDICINE

## 2022-04-23 PROCEDURE — 96372 THER/PROPH/DIAG INJ SC/IM: CPT

## 2022-04-23 PROCEDURE — 99284 EMERGENCY DEPT VISIT MOD MDM: CPT

## 2022-04-23 RX ORDER — CYCLOBENZAPRINE HCL 10 MG
10 TABLET ORAL 3 TIMES DAILY PRN
Qty: 21 TABLET | Refills: 0 | Status: SHIPPED | OUTPATIENT
Start: 2022-04-23 | End: 2022-05-03

## 2022-04-23 RX ORDER — KETOROLAC TROMETHAMINE 30 MG/ML
30 INJECTION, SOLUTION INTRAMUSCULAR; INTRAVENOUS ONCE
Status: COMPLETED | OUTPATIENT
Start: 2022-04-23 | End: 2022-04-23

## 2022-04-23 RX ADMIN — KETOROLAC TROMETHAMINE 30 MG: 30 INJECTION, SOLUTION INTRAMUSCULAR; INTRAVENOUS at 17:17

## 2022-04-23 ASSESSMENT — PAIN DESCRIPTION - LOCATION: LOCATION: BACK;BUTTOCKS

## 2022-04-23 ASSESSMENT — PAIN - FUNCTIONAL ASSESSMENT: PAIN_FUNCTIONAL_ASSESSMENT: 0-10

## 2022-04-23 ASSESSMENT — PAIN DESCRIPTION - ORIENTATION: ORIENTATION: LOWER;LEFT

## 2022-04-23 ASSESSMENT — PAIN SCALES - GENERAL: PAINLEVEL_OUTOF10: 8

## 2022-04-23 NOTE — ED PROVIDER NOTES
[address] COMPLAINT   Chief Complaint   Patient presents with    Fall    Back Pain        HPI   Juan Carlos Baker is a 57 y.o. female who presents with acute right-sided low back pain, onset was PTA. The duration has been constant since the onset. The patient has no associated head injury or hip joint pain. She is able to walk. She was priming her living room walls for painting, while stepping off a 3-step ladder, she landed on her right buttock. There are no alleviating factors. The context is that the symptoms started spontaneously, without any known precipitants. REVIEW OF SYSTEMS   Back: back injury suffered  Pulmonary: No difficulty breathing or new cough   General: No fevers   : See HPI, no hematuria or dysuria   See HPI for further details. All other review of systems are reviewed and are otherwise negative.      PAST MEDICAL & SURGICAL HISTORY   Past Medical History:   Diagnosis Date    Arthritis     [facility]     Depression     Diabetes mellitus ([facility]     Fatty liver     GERD (gastroesophageal reflux disease)     Hypertension     Incontinence     Liver disease     AIH with PBC      Past Surgical History:   Procedure Laterality Date    BUNIONECTOMY      BUNIONECTOMY      CARPAL TUNNEL RELEASE Right     COLONOSCOPY      FINGER TRIGGER RELEASE Right     HEEL SPUR SURGERY      HYSTERECTOMY      INCONTINENCE SURGERY      LIVER BIOPSY      MS EGD TRANSORAL BIOPSY SINGLE/MULTIPLE N/A 9/6/2017    EGD BIOPSY performed by Sarah Rae MD at [address] [place] [address] ENDOSCOPY  2017        CURRENT MEDICATIONS   Current Outpatient Rx   Medication Sig Dispense Refill    cyclobenzaprine (FLEXERIL) 10 MG tablet Take 1 tablet by mouth 3 times daily as needed for Muscle spasms 21 tablet 0    FLUoxetine (PROZAC) 10 MG capsule Take 10 mg by mouth daily      ursodiol (ACTIGALL) 500 MG tablet Take 0.5 tablets by mouth 3 times daily (Patient taking differently: Take 250 mg by mouth 2 times daily ) 45 tablet 3    budesonide (ENTOCORT EC) 3 MG extended release capsule Take 2 capsules by mouth every morning (Patient taking differently: Take 6 mg by mouth 3 times daily ) 180 capsule 1    omeprazole (PRILOSEC) 40 MG delayed release capsule Take 40 mg by mouth as needed      metFORMIN (GLUCOPHAGE) 500 MG tablet Take 1,000 mg by mouth 2 times daily (with meals)       lisinopril (PRINIVIL;ZESTRIL) 20 MG tablet Take 20 mg by mouth daily. ALLERGIES   Allergies   Allergen Reactions    Erythromycin         SOCIAL AND FAMILY HISTORY   Social History     Socioeconomic History    Marital status:      Spouse name: None    Number of children: 2    Years of education: None    Highest education level: None   Occupational History    None   Tobacco Use    Smoking status: Never Smoker    Smokeless tobacco: Never Used   Vaping Use    Vaping Use: Never used   Substance and Sexual Activity    Alcohol use: No    Drug use: No    Sexual activity: Not Currently   Other Topics Concern    None   Social History Narrative    None     Social Determinants of Health     Financial Resource Strain:     Difficulty of Paying Living Expenses: Not on file   Food Insecurity:     Worried About Running Out of Food in the Last Year: Not on file    Milena of Food in the Last Year: Not on file   Transportation Needs:     Lack of Transportation (Medical): Not on file    Lack of Transportation (Non-Medical):  Not on file   Physical Activity:     Days of Exercise per Week: Not on file    Minutes of Exercise per Session: Not on file   Stress:     Feeling of Stress : Not on file   Social Connections:     Frequency of Communication with Friends and Family: Not on file    Frequency of Social Gatherings with Friends and Family: Not on file    Attends Temple Services: Not on file    Active Member of Clubs or Organizations: Not on file   Racheal Attends Club or Organization Meetings: Not on file    Marital Status: Not on file   Intimate Partner Violence:     Fear of Current or Ex-Partner: Not on file    Emotionally Abused: Not on file    Physically Abused: Not on file    Sexually Abused: Not on file   Housing Stability:     Unable to Pay for Housing in the Last Year: Not on file    Number of Places Lived in the Last Year: Not on file    Unstable Housing in the Last Year: Not on file      Family History   Problem Relation Age of Onset    Liver Disease Father     Heart Disease Father     Emphysema Father     Stroke Father     Other Mother         Car Accident    Arthritis Brother     Heart Disease Brother     Heart Disease Paternal Grandfather     Diabetes Paternal Grandfather     Hypertension Paternal Grandfather     Stroke Paternal Grandfather     Heart Disease Paternal Grandmother     Hypertension Paternal Grandmother     Stroke Paternal Grandmother     Heart Disease Maternal Grandfather     Heart Disease Maternal Grandmother     Colon Cancer Neg Hx         PHYSICAL EXAM   VITAL SIGNS: /88   Pulse 87   Temp 98.3 °F (36.8 °C) (Oral)   Resp 17   SpO2 98%    Constitutional: Well developed, well nourished, mild distress  Eyes: Sclera nonicteric, conjunctiva moist   HENT: Atraumatic, nose normal   Neck: Supple, no JVD   Respiratory: Normal respiratory effort  Musculoskeletal: No edema, no deformity, mild tenderness to right posterior superior buttock area in lower lumbar region with palpation; without midline L/S stepoff or deformity  Integument: No rash, dry skin   Neurologic: Alert & oriented, normal speech   Psychiatric: Cooperative, pleasant affect     RADIOLOGY/PROCEDURES   XR LUMBAR SPINE (2-3 VIEWS)   Final Result   1. No acute fracture is seen. 2. Mild degenerative changes of the lumbar spine. **This report has been created using voice recognition software.   It may contain minor errors which are

## 2022-04-23 NOTE — ED NOTES
Pt to ED c/o fall off 3 step ladder after she lost her footing. Pt rating back pain 8/10 in the lower left back. Pt ambulatory to room. Respirations unlabored.       Dominic VALERA RN  04/23/22 3596

## 2022-08-02 ENCOUNTER — HOSPITAL ENCOUNTER (OUTPATIENT)
Dept: ULTRASOUND IMAGING | Age: 62
Discharge: HOME | End: 2022-08-02
Payer: COMMERCIAL

## 2022-08-02 DIAGNOSIS — K75.4 AUTOIMMUNE HEPATITIS (HCC): ICD-10-CM

## 2022-08-02 DIAGNOSIS — K74.3 PRIMARY BILIARY CHOLANGITIS (HCC): ICD-10-CM

## 2022-08-02 PROCEDURE — 76705 ECHO EXAM OF ABDOMEN: CPT

## 2022-10-21 ENCOUNTER — APPOINTMENT (OUTPATIENT)
Dept: GENERAL RADIOLOGY | Age: 62
End: 2022-10-21
Payer: COMMERCIAL

## 2022-10-21 ENCOUNTER — HOSPITAL ENCOUNTER (EMERGENCY)
Age: 62
Discharge: HOME | End: 2022-10-21
Payer: COMMERCIAL

## 2022-10-21 VITALS
RESPIRATION RATE: 16 BRPM | TEMPERATURE: 97 F | HEART RATE: 77 BPM | OXYGEN SATURATION: 99 % | SYSTOLIC BLOOD PRESSURE: 153 MMHG | DIASTOLIC BLOOD PRESSURE: 76 MMHG

## 2022-10-21 DIAGNOSIS — J06.9 VIRAL URI WITH COUGH: Primary | ICD-10-CM

## 2022-10-21 LAB
FLU A ANTIGEN: NEGATIVE
FLU B ANTIGEN: NEGATIVE
SARS-COV-2, NAA: NOT  DETECTED

## 2022-10-21 PROCEDURE — 87804 INFLUENZA ASSAY W/OPTIC: CPT

## 2022-10-21 PROCEDURE — 99213 OFFICE O/P EST LOW 20 MIN: CPT | Performed by: EMERGENCY MEDICINE

## 2022-10-21 PROCEDURE — 99213 OFFICE O/P EST LOW 20 MIN: CPT

## 2022-10-21 PROCEDURE — 87635 SARS-COV-2 COVID-19 AMP PRB: CPT

## 2022-10-21 PROCEDURE — 71045 X-RAY EXAM CHEST 1 VIEW: CPT

## 2022-10-21 ASSESSMENT — ENCOUNTER SYMPTOMS
BACK PAIN: 0
CHEST TIGHTNESS: 0
SORE THROAT: 1
SHORTNESS OF BREATH: 0
COUGH: 1
ABDOMINAL PAIN: 0

## 2022-10-21 ASSESSMENT — PAIN SCALES - GENERAL: PAINLEVEL_OUTOF10: 6

## 2022-10-21 ASSESSMENT — PAIN DESCRIPTION - LOCATION: LOCATION: OTHER (COMMENT)

## 2022-10-21 ASSESSMENT — PAIN - FUNCTIONAL ASSESSMENT: PAIN_FUNCTIONAL_ASSESSMENT: 0-10

## 2022-10-21 ASSESSMENT — PAIN DESCRIPTION - PAIN TYPE: TYPE: ACUTE PAIN

## 2022-10-21 ASSESSMENT — PAIN DESCRIPTION - DESCRIPTORS: DESCRIPTORS: SHARP;SORE

## 2022-10-21 NOTE — ED PROVIDER NOTES
[facility]  Urgent Care Encounter       CHIEF COMPLAINT       Chief Complaint   Patient presents with    Pharyngitis     Cough onset wed night  covid exposure at work       Nurses Notes reviewed and I agree except as noted in the HPI. HISTORY OF PRESENT ILLNESS   Gary Hope is a 57 y.o. female who presents for complaints of mild sore throat, body aches, chills, cough that is nonproductive. No fever. No shortness of breath. She states she does have left lower, lateral rib pain with her cough. She denies anterior or central chest pain. Her cough is nonproductive. Patient states she has been exposed to COVID-19 last week. She has been vaccinated and boosted for COVID-19 and has had previous COVID-19 illness. HPI    REVIEW OF SYSTEMS     Review of Systems   Constitutional:  Positive for chills and fatigue. Negative for activity change and fever. HENT:  Positive for congestion and sore throat. Respiratory:  Positive for cough. Negative for chest tightness and shortness of breath. Cardiovascular:  Positive for chest pain (left, lateral lower rib/chest discomfort). Gastrointestinal:  Negative for abdominal pain. Musculoskeletal:  Negative for back pain. Neurological:  Positive for headaches. PAST MEDICAL HISTORY         Diagnosis Date    Arthritis     Autoimmune [facility] ([facility]     Depression     Diabetes mellitus ([facility]     Fatty liver     GERD (gastroesophageal reflux disease)     Hypertension     Incontinence     Liver disease     AIH with PBC       SURGICALHISTORY     Patient  has a past surgical history that includes Hysterectomy; Tubal ligation; Bunionectomy; Heel spur surgery; Incontinence surgery; pr egd transoral biopsy single/multiple (N/A, 9/6/2017); Upper gastrointestinal endoscopy (2017); liver biopsy; Colonoscopy; Bunionectomy; Carpal tunnel release (Right); and Finger trigger release (Right).     CURRENT MEDICATIONS       Discharge Medication List as of 10/21/2022  9:16 AM        CONTINUE these medications which have NOT CHANGED    Details   FLUoxetine (PROZAC) 10 MG capsule Take 10 mg by mouth dailyHistorical Med      ursodiol (ACTIGALL) 500 MG tablet Take 0.5 tablets by mouth 3 times daily, Disp-45 tablet,R-3Normal      budesonide (ENTOCORT EC) 3 MG extended release capsule Take 2 capsules by mouth every morning, Disp-180 capsule, R-1Normal      omeprazole (PRILOSEC) 40 MG delayed release capsule Take 40 mg by mouth as neededHistorical Med      metFORMIN (GLUCOPHAGE) 500 MG tablet Take 1,000 mg by mouth 2 times daily (with meals) Historical Med      lisinopril (PRINIVIL;ZESTRIL) 20 MG tablet Take 20 mg by mouth daily. Historical Med             ALLERGIES     Patient is is allergic to erythromycin. Patients   Immunization History   Administered Date(s) Administered    COVID-19, PFIZER GRAY top, DO NOT Dilute, (age 15 y+), IM, 30 mcg/0.3 mL 07/18/2022    COVID-19, PFIZER PURPLE top, DILUTE for use, (age 15 y+), 30mcg/0.3mL 11/17/2021, 12/08/2021       FAMILY HISTORY     Patient's family history includes Arthritis in her brother; Diabetes in her paternal grandfather; Emphysema in her father; Heart Disease in her brother, father, maternal grandfather, maternal grandmother, paternal grandfather, and paternal grandmother; Hypertension in her paternal grandfather and paternal grandmother; Liver Disease in her father; Other in her mother; Stroke in her father, paternal grandfather, and paternal grandmother. SOCIAL HISTORY     Patient  reports that she has never smoked. She has never used smokeless tobacco. She reports that she does not drink alcohol and does not use drugs. PHYSICAL EXAM     ED TRIAGE VITALS  BP: (!) 153/76, Temp: 97 °F (36.1 °C), Heart Rate: 77, Resp: 16, SpO2: 99 %,Estimated body mass index is 36.14 kg/m² as calculated from the following:    Height as of 9/18/20: 5' 2\" (1.575 m). Weight as of 9/18/20: 197 lb 9.6 oz (89.6 kg). ,No LMP recorded. Patient has had a hysterectomy. Physical Exam  Constitutional:       Appearance: She is ill-appearing. She is not toxic-appearing. HENT:      Head: Normocephalic. Right Ear: Tympanic membrane and ear canal normal.      Left Ear: Tympanic membrane and ear canal normal.      Nose: Congestion present. No rhinorrhea. Mouth/Throat:      Mouth: Mucous membranes are moist. No oral lesions. Pharynx: No pharyngeal swelling, oropharyngeal exudate or posterior oropharyngeal erythema. Tonsils: No tonsillar exudate. Cardiovascular:      Rate and Rhythm: Normal rate and regular rhythm. Heart sounds: Normal heart sounds. No murmur heard. Pulmonary:      Effort: Pulmonary effort is normal.      Breath sounds: Normal breath sounds. Chest:      Chest wall: Tenderness (left, lower, lateral ribs) present. Abdominal:      General: There is no distension. Palpations: Abdomen is soft. Tenderness: There is no abdominal tenderness. There is no guarding. Skin:     General: Skin is warm and dry. Capillary Refill: Capillary refill takes less than 2 seconds. Neurological:      General: No focal deficit present. Mental Status: She is alert. DIAGNOSTIC RESULTS     Labs:  Results for orders placed or performed during the hospital encounter of 10/21/22   COVID-19, Rapid   Result Value Ref Range    SARS-CoV-2, ELDA NOT  DETECTED NOT DETECTED   Rapid influenza A/B antigens   Result Value Ref Range    Flu A Antigen Negative NEGATIVE    Flu B Antigen Negative NEGATIVE       IMAGING:    XR CHEST 1 VIEW   Final Result   No acute cardiopulmonary disease stable chest            **This report has been created using voice recognition software. It may contain minor errors which are inherent in voice recognition technology. **      Final report electronically signed by Dr. Baljinder Tomas on 10/21/2022 8:46 AM            EKG:      URGENT CARE COURSE:     Vitals:    10/21/22 0815   BP: (!) 153/76 Pulse: 77   Resp: 16   Temp: 97 °F (36.1 °C)   TempSrc: Temporal   SpO2: 99%       Medications - No data to display         PROCEDURES:  None    FINAL IMPRESSION      1. Viral URI with cough          DISPOSITION/ PLAN   Patient presents for his likely viral URI with cough. Patient had mild left-sided pleural discomfort. 1 view chest x-ray is performed as we currently only have mobile chest x-ray at this facility. This was read as negative. COVID and influenza tests are negative today. She will be discharged and advised drink plenty of water. Tylenol for discomfort. Coricidin HBP over-the-counter as needed. Return for new or worsening symptoms        PATIENT REFERRED TO:  VIVIEN Pacheco CNP  [address] [address]      DISCHARGE MEDICATIONS:  Discharge Medication List as of 10/21/2022  9:16 AM          Discharge Medication List as of 10/21/2022  9:16 AM          Discharge Medication List as of 10/21/2022  9:16 AM          VIVIEN Herring CNP    (Please note that portions of this note were completed with a voice recognition program. Efforts were made to edit the dictations but occasionally words are mis-transcribed.)           VIVIEN Herring CNP  10/21/22 2308

## 2022-10-21 NOTE — Clinical Note
Dave Qureshi was seen and treated in our emergency department on 10/21/2022. She may return to work on 10/23/2022. If you have any questions or concerns, please don't hesitate to call.       Greg Felty, APRN - CNP

## 2022-11-08 ENCOUNTER — HOSPITAL ENCOUNTER (OUTPATIENT)
Age: 62
Discharge: HOME | End: 2022-11-08
Payer: COMMERCIAL

## 2022-11-08 LAB
ALBUMIN SERPL-MCNC: 4.2 G/DL (ref 3.5–5.1)
ALP BLD-CCNC: 588 U/L (ref 38–126)
ALT SERPL-CCNC: 86 U/L (ref 11–66)
AST SERPL-CCNC: 56 U/L (ref 5–40)
BILIRUB SERPL-MCNC: 0.8 MG/DL (ref 0.3–1.2)
BILIRUBIN DIRECT: 0.3 MG/DL (ref 0–0.3)
TOTAL PROTEIN: 8.2 G/DL (ref 6.1–8)

## 2022-11-08 PROCEDURE — 80076 HEPATIC FUNCTION PANEL: CPT

## 2022-11-08 PROCEDURE — 36415 COLL VENOUS BLD VENIPUNCTURE: CPT

## 2023-04-26 LAB — MAMMOGRAPHY, EXTERNAL: NORMAL

## 2023-12-26 ENCOUNTER — HOSPITAL ENCOUNTER (EMERGENCY)
Age: 63
Discharge: HOME | End: 2023-12-26
Payer: MEDICARE

## 2023-12-26 VITALS
DIASTOLIC BLOOD PRESSURE: 75 MMHG | RESPIRATION RATE: 16 BRPM | HEART RATE: 74 BPM | OXYGEN SATURATION: 98 % | TEMPERATURE: 98 F | HEIGHT: 62 IN | BODY MASS INDEX: 35.88 KG/M2 | WEIGHT: 195 LBS | SYSTOLIC BLOOD PRESSURE: 139 MMHG

## 2023-12-26 DIAGNOSIS — N39.0 ACUTE LOWER UTI: Primary | ICD-10-CM

## 2023-12-26 LAB
BILIRUB UR STRIP.AUTO-MCNC: ABNORMAL MG/DL
CHARACTER UR: CLEAR
COLOR: YELLOW
GLUCOSE UR QL STRIP.AUTO: NEGATIVE MG/DL
KETONES UR QL STRIP.AUTO: ABNORMAL
NITRITE UR QL STRIP.AUTO: NEGATIVE
PH UR STRIP.AUTO: 6 [PH] (ref 5–9)
PROT UR STRIP.AUTO-MCNC: 100 MG/DL
RBC #/AREA URNS HPF: ABNORMAL /[HPF]
SP GR UR STRIP.AUTO: >= 1.03 (ref 1–1.03)
UROBILINOGEN, URINE: 1 EU/DL (ref 0.2–1)
WBC #/AREA URNS HPF: ABNORMAL /[HPF]

## 2023-12-26 PROCEDURE — 99213 OFFICE O/P EST LOW 20 MIN: CPT

## 2023-12-26 PROCEDURE — 99213 OFFICE O/P EST LOW 20 MIN: CPT | Performed by: EMERGENCY MEDICINE

## 2023-12-26 PROCEDURE — 87086 URINE CULTURE/COLONY COUNT: CPT

## 2023-12-26 PROCEDURE — 81003 URINALYSIS AUTO W/O SCOPE: CPT

## 2023-12-26 PROCEDURE — 87077 CULTURE AEROBIC IDENTIFY: CPT

## 2023-12-26 PROCEDURE — 87186 SC STD MICRODIL/AGAR DIL: CPT

## 2023-12-26 RX ORDER — PHENAZOPYRIDINE HYDROCHLORIDE 100 MG/1
100 TABLET, FILM COATED ORAL 3 TIMES DAILY PRN
Qty: 9 TABLET | Refills: 0 | Status: SHIPPED | OUTPATIENT
Start: 2023-12-26 | End: 2023-12-29

## 2023-12-26 RX ORDER — SULFAMETHOXAZOLE AND TRIMETHOPRIM 800; 160 MG/1; MG/1
1 TABLET ORAL 2 TIMES DAILY
Qty: 10 TABLET | Refills: 0 | Status: SHIPPED | OUTPATIENT
Start: 2023-12-26 | End: 2023-12-31

## 2023-12-26 ASSESSMENT — PAIN - FUNCTIONAL ASSESSMENT: PAIN_FUNCTIONAL_ASSESSMENT: NONE - DENIES PAIN

## 2023-12-26 NOTE — ED PROVIDER NOTES
[address]  Urgent Care Encounter       CHIEF COMPLAINT       Chief Complaint   Patient presents with    Urinary Frequency       Nurses Notes reviewed and I agree except as noted in the HPI. HISTORY OF PRESENT ILLNESS   Klaus Watters is a 70 y.o. female who presents for complaints of urinary urgency, frequency and states she only dribbles a small amount when she does go to the bathroom. She has burning at the end of her stream.  She states she gets urinary tract infections frequently and this is how her infections have started out in the past.  Denies any fever. No flank pain. No blood in the urine. No nausea, vomiting or diarrhea    HPI    REVIEW OF SYSTEMS     Review of Systems   Constitutional:  Negative for activity change, fatigue and fever. Genitourinary:  Positive for dysuria, frequency and urgency. Negative for decreased urine volume, difficulty urinating and hematuria. PAST MEDICAL HISTORY         Diagnosis Date    Arthritis     Autoimmune hepatitis ([facility])     Depression     Diabetes mellitus ([facility])     Fatty liver     GERD (gastroesophageal reflux disease)     Hypertension     Incontinence     Liver disease     AIH with PBC       SURGICALHISTORY     Patient  has a past surgical history that includes Hysterectomy; Tubal ligation; Bunionectomy; Heel spur surgery; Incontinence surgery; pr egd transoral biopsy single/multiple (N/A, 9/6/2017); Upper gastrointestinal endoscopy (2017); liver biopsy; Colonoscopy; Bunionectomy; Carpal tunnel release (Right); and Finger trigger release (Right).     CURRENT MEDICATIONS       Discharge Medication List as of 12/26/2023  1:39 PM        CONTINUE these medications which have NOT CHANGED    Details   FLUoxetine (PROZAC) 10 MG capsule Take 1 capsule by mouth dailyHistorical Med      ursodiol (ACTIGALL) 500 MG tablet Take 0.5 tablets by mouth 3 times daily, Disp-45 tablet,R-3Normal      budesonide (ENTOCORT EC) 3 MG extended release capsule

## 2023-12-26 NOTE — DISCHARGE INSTRUCTIONS
Bactrim as directed until gone    Drink at least 6 glasses of water per day while on Bactrim    Pyridium as directed as needed for urinary discomfort.   You may discontinue this medication if your symptoms improve    Tylenol as needed for discomfort    Follow-up family physician or return here if no improvement of symptoms in 3 days, sooner if symptoms worsen

## 2023-12-29 LAB
BACTERIA UR CULT: ABNORMAL
BACTERIA UR CULT: ABNORMAL
ORGANISM: ABNORMAL
ORGANISM: ABNORMAL

## 2024-02-13 ENCOUNTER — TELEPHONE (OUTPATIENT)
Dept: FAMILY MEDICINE CLINIC | Age: 64
End: 2024-02-13

## 2024-02-13 NOTE — TELEPHONE ENCOUNTER
----- Message from Reyna Avery sent at 2/12/2024  4:00 PM EST -----  Subject: Appointment Request    Reason for Call: New Patient/New to Provider Appointment needed: New   Patient Request Appointment    QUESTIONS    Reason for appointment request? No appointments available during search     Additional Information for Provider? Patient would like if office staff   could call back to establish care with Anderson Gallegos, VIVIEN-CNP, no   available appointments during search  ---------------------------------------------------------------------------  --------------  CALL BACK INFO  [phone]; OK to leave message on voicemail  ---------------------------------------------------------------------------  --------------  SCRIPT ANSWERS

## 2024-02-19 SDOH — HEALTH STABILITY: PHYSICAL HEALTH: ON AVERAGE, HOW MANY DAYS PER WEEK DO YOU ENGAGE IN MODERATE TO STRENUOUS EXERCISE (LIKE A BRISK WALK)?: 0 DAYS

## 2024-02-19 SDOH — HEALTH STABILITY: PHYSICAL HEALTH: ON AVERAGE, HOW MANY MINUTES DO YOU ENGAGE IN EXERCISE AT THIS LEVEL?: 0 MIN

## 2024-02-20 ENCOUNTER — OFFICE VISIT (OUTPATIENT)
Dept: FAMILY MEDICINE CLINIC | Age: 64
End: 2024-02-20
Payer: MEDICARE

## 2024-02-20 VITALS
RESPIRATION RATE: 16 BRPM | WEIGHT: 199.4 LBS | HEIGHT: 62 IN | HEART RATE: 76 BPM | SYSTOLIC BLOOD PRESSURE: 118 MMHG | BODY MASS INDEX: 36.7 KG/M2 | DIASTOLIC BLOOD PRESSURE: 70 MMHG

## 2024-02-20 DIAGNOSIS — E78.00 PURE HYPERCHOLESTEROLEMIA: ICD-10-CM

## 2024-02-20 DIAGNOSIS — I10 ESSENTIAL HYPERTENSION: ICD-10-CM

## 2024-02-20 DIAGNOSIS — E11.9 TYPE 2 DIABETES MELLITUS WITHOUT COMPLICATION, WITHOUT LONG-TERM CURRENT USE OF INSULIN (HCC): ICD-10-CM

## 2024-02-20 DIAGNOSIS — K75.4 AUTOIMMUNE HEPATITIS (HCC): ICD-10-CM

## 2024-02-20 DIAGNOSIS — K74.3 PRIMARY BILIARY CHOLANGITIS (HCC): ICD-10-CM

## 2024-02-20 DIAGNOSIS — J32.0 RIGHT MAXILLARY SINUSITIS: Primary | ICD-10-CM

## 2024-02-20 PROBLEM — G56.21 ULNAR NEURITIS, RIGHT: Status: ACTIVE | Noted: 2021-03-10

## 2024-02-20 PROCEDURE — 3074F SYST BP LT 130 MM HG: CPT | Performed by: NURSE PRACTITIONER

## 2024-02-20 PROCEDURE — 3078F DIAST BP <80 MM HG: CPT | Performed by: NURSE PRACTITIONER

## 2024-02-20 PROCEDURE — 1123F ACP DISCUSS/DSCN MKR DOCD: CPT | Performed by: NURSE PRACTITIONER

## 2024-02-20 PROCEDURE — 99204 OFFICE O/P NEW MOD 45 MIN: CPT | Performed by: NURSE PRACTITIONER

## 2024-02-20 RX ORDER — CEFDINIR 300 MG/1
300 CAPSULE ORAL 2 TIMES DAILY
Qty: 14 CAPSULE | Refills: 0 | Status: SHIPPED | OUTPATIENT
Start: 2024-02-20 | End: 2024-02-27

## 2024-02-20 RX ORDER — BUDESONIDE 3 MG/1
6 CAPSULE, COATED PELLETS ORAL 3 TIMES DAILY
Qty: 30 CAPSULE | Refills: 0 | COMMUNITY
Start: 2024-02-20

## 2024-02-20 SDOH — ECONOMIC STABILITY: INCOME INSECURITY: HOW HARD IS IT FOR YOU TO PAY FOR THE VERY BASICS LIKE FOOD, HOUSING, MEDICAL CARE, AND HEATING?: NOT HARD AT ALL

## 2024-02-20 SDOH — ECONOMIC STABILITY: HOUSING INSECURITY
IN THE LAST 12 MONTHS, WAS THERE A TIME WHEN YOU DID NOT HAVE A STEADY PLACE TO SLEEP OR SLEPT IN A SHELTER (INCLUDING NOW)?: NO

## 2024-02-20 SDOH — ECONOMIC STABILITY: FOOD INSECURITY: WITHIN THE PAST 12 MONTHS, THE FOOD YOU BOUGHT JUST DIDN'T LAST AND YOU DIDN'T HAVE MONEY TO GET MORE.: NEVER TRUE

## 2024-02-20 SDOH — ECONOMIC STABILITY: FOOD INSECURITY: WITHIN THE PAST 12 MONTHS, YOU WORRIED THAT YOUR FOOD WOULD RUN OUT BEFORE YOU GOT MONEY TO BUY MORE.: NEVER TRUE

## 2024-02-20 ASSESSMENT — ENCOUNTER SYMPTOMS
SINUS PRESSURE: 1
COUGH: 0
SINUS PAIN: 1
SHORTNESS OF BREATH: 0
RHINORRHEA: 1
ABDOMINAL PAIN: 0
NAUSEA: 0

## 2024-02-20 ASSESSMENT — PATIENT HEALTH QUESTIONNAIRE - PHQ9
SUM OF ALL RESPONSES TO PHQ QUESTIONS 1-9: 0
SUM OF ALL RESPONSES TO PHQ QUESTIONS 1-9: 0
SUM OF ALL RESPONSES TO PHQ9 QUESTIONS 1 & 2: 0
SUM OF ALL RESPONSES TO PHQ QUESTIONS 1-9: 0
SUM OF ALL RESPONSES TO PHQ QUESTIONS 1-9: 0
2. FEELING DOWN, DEPRESSED OR HOPELESS: 0

## 2024-02-20 NOTE — PROGRESS NOTES
Subjective:      Patient ID: Yessenia Elkins 1960 is a 64 y.o. female here for evaluation.     NP to establish care. Former PCP was Kellen Reyez    Past Medical History:   Diagnosis Date    Arthritis     Autoimmune hepatitis (HCC)     Depression     Diabetes mellitus (HCC)     Fatty liver     GERD (gastroesophageal reflux disease)     Hypertension     Incontinence     Liver disease     AIH with PBC       Past Surgical History:   Procedure Laterality Date    BUNIONECTOMY      BUNIONECTOMY      CARPAL TUNNEL RELEASE Right     COLONOSCOPY      FINGER TRIGGER RELEASE Right     HEEL SPUR SURGERY      HYSTERECTOMY (CERVIX STATUS UNKNOWN)      INCONTINENCE SURGERY      LIVER BIOPSY      [place] EGD TRANSORAL BIOPSY SINGLE/MULTIPLE N/A 9/6/2017    EGD BIOPSY performed by Luz Marina Carvalho MD at [facility]    TUBAL LIGATION      UPPER GASTROINTESTINAL ENDOSCOPY  2017       Family History   Problem Relation Age of Onset    Liver Disease Father     Heart Disease Father     Emphysema Father     Stroke Father     Other Mother         Car Accident    Arthritis Brother     Heart Disease Brother     Heart Disease Paternal Grandfather     Diabetes Paternal Grandfather     Hypertension Paternal Grandfather     Stroke Paternal Grandfather     Heart Disease Paternal Grandmother     Hypertension Paternal Grandmother     Stroke Paternal Grandmother     Heart Disease Maternal Grandfather     Heart Disease Maternal Grandmother     Colon Cancer Neg Hx        Current Outpatient Medications   Medication Sig Dispense Refill    budesonide (ENTOCORT EC) 3 MG delayed release capsule Take 2 capsules by mouth 3 times daily 30 capsule 0    cefdinir (OMNICEF) 300 MG capsule Take 1 capsule by mouth 2 times daily for 7 days 14 capsule 0    FLUoxetine (PROZAC) 10 MG capsule Take 1 capsule by mouth daily      ursodiol (ACTIGALL) 500 MG tablet Take 0.5 tablets by mouth 3 times daily (Patient taking differently: Take 0.5 tablets by mouth 2 times daily) 45 tablet

## 2024-02-22 ENCOUNTER — TELEPHONE (OUTPATIENT)
Dept: FAMILY MEDICINE CLINIC | Age: 64
End: 2024-02-22

## 2024-02-22 DIAGNOSIS — E11.9 TYPE 2 DIABETES MELLITUS WITHOUT COMPLICATION, WITHOUT LONG-TERM CURRENT USE OF INSULIN (HCC): Primary | ICD-10-CM

## 2024-02-22 DIAGNOSIS — R79.89 ABNORMAL THYROID BLOOD TEST: ICD-10-CM

## 2024-02-22 NOTE — TELEPHONE ENCOUNTER
The patient returned the call and was updated, lab orders placed in the mail to her home per her request.

## 2024-02-22 NOTE — TELEPHONE ENCOUNTER
Notify patient past medical and most recent labs reviewed from former PCP.   Labs look ok other than slight change in her thyroid levels.  Would like to repeat testing on this and an A1C in 6 months prior to her upcoming appt.  Liver enzymes slightly elevated but no concern with her liver hx.  Lab orders placed.

## 2024-02-26 ENCOUNTER — PATIENT MESSAGE (OUTPATIENT)
Dept: FAMILY MEDICINE CLINIC | Age: 64
End: 2024-02-26

## 2024-02-26 DIAGNOSIS — E11.9 TYPE 2 DIABETES MELLITUS WITHOUT COMPLICATION, WITHOUT LONG-TERM CURRENT USE OF INSULIN (HCC): Primary | ICD-10-CM

## 2024-02-27 RX ORDER — SEMAGLUTIDE 0.68 MG/ML
0.25 INJECTION, SOLUTION SUBCUTANEOUS WEEKLY
Qty: 3 ML | Refills: 0 | Status: SHIPPED | OUTPATIENT
Start: 2024-02-27

## 2024-02-27 NOTE — TELEPHONE ENCOUNTER
From: Yessenia Zabala  To: Anderson Gallegos  Sent: 2/26/2024 9:25 PM EST  Subject: Trulicity or ozempic    Hi I was wondering if I could try something like Trulicity or Ozempic for my diabetes an to help with weight loss. Just checking to see if it's something I could try. Was going to ask at appointment but for got  Thanks. Yessenia zabala

## 2024-03-20 ENCOUNTER — TELEPHONE (OUTPATIENT)
Dept: FAMILY MEDICINE CLINIC | Age: 64
End: 2024-03-20

## 2024-03-20 DIAGNOSIS — E11.9 TYPE 2 DIABETES MELLITUS WITHOUT COMPLICATION, WITHOUT LONG-TERM CURRENT USE OF INSULIN (HCC): ICD-10-CM

## 2024-03-20 DIAGNOSIS — J32.0 RIGHT MAXILLARY SINUSITIS: ICD-10-CM

## 2024-03-20 RX ORDER — OMEPRAZOLE 40 MG/1
40 CAPSULE, DELAYED RELEASE ORAL DAILY
Qty: 90 CAPSULE | Refills: 3 | Status: SHIPPED | OUTPATIENT
Start: 2024-03-20

## 2024-03-20 RX ORDER — LISINOPRIL 20 MG/1
20 TABLET ORAL DAILY
Qty: 90 TABLET | Refills: 3 | Status: SHIPPED | OUTPATIENT
Start: 2024-03-20

## 2024-03-20 RX ORDER — CEFDINIR 300 MG/1
300 CAPSULE ORAL 2 TIMES DAILY
Qty: 14 CAPSULE | Refills: 0 | Status: SHIPPED | OUTPATIENT
Start: 2024-03-20 | End: 2024-03-27

## 2024-03-20 RX ORDER — FLUOXETINE 10 MG/1
10 CAPSULE ORAL DAILY
Qty: 90 CAPSULE | Refills: 3 | Status: SHIPPED | OUTPATIENT
Start: 2024-03-20

## 2024-03-20 RX ORDER — SEMAGLUTIDE 0.68 MG/ML
0.5 INJECTION, SOLUTION SUBCUTANEOUS WEEKLY
Qty: 3 ML | Refills: 0 | Status: SHIPPED | OUTPATIENT
Start: 2024-03-20

## 2024-03-20 NOTE — TELEPHONE ENCOUNTER
Renea with [facility] [address] called office stating they received the prescription refills. They have a question about the Metformin dose. They have never filled this for pt before. Metformin was sent as 500mg 2 po BID. This does not match the quantity that was sent. Please clarify.

## 2024-03-20 NOTE — TELEPHONE ENCOUNTER
Pt called office requesting a refill of the Prozac, Lisinopril, Metformin and Omeprazole. If no call back she will check with the pharmacy after 4pm. Refill if appropriate.

## 2024-03-20 NOTE — TELEPHONE ENCOUNTER
Pt called office requesting a refill of the antibiotic that you gave her at her last appt. Pt c/o sinus issues the past 2-3days. Pt has a cough, nasal congestion and facial pain. If no call back she will check with [facility] [address] after 4pm. Please advise.

## 2024-05-02 DIAGNOSIS — E11.9 TYPE 2 DIABETES MELLITUS WITHOUT COMPLICATION, WITHOUT LONG-TERM CURRENT USE OF INSULIN (HCC): ICD-10-CM

## 2024-05-03 RX ORDER — SEMAGLUTIDE 0.68 MG/ML
INJECTION, SOLUTION SUBCUTANEOUS
Qty: 3 ML | Refills: 0 | OUTPATIENT
Start: 2024-05-03

## 2024-05-03 RX ORDER — SEMAGLUTIDE 0.68 MG/ML
0.5 INJECTION, SOLUTION SUBCUTANEOUS WEEKLY
Qty: 3 ML | Refills: 2 | Status: SHIPPED | OUTPATIENT
Start: 2024-05-03

## 2024-06-17 ENCOUNTER — HOSPITAL ENCOUNTER (EMERGENCY)
Age: 64
Discharge: HOME | End: 2024-06-17
Payer: MEDICARE

## 2024-06-17 VITALS
SYSTOLIC BLOOD PRESSURE: 119 MMHG | RESPIRATION RATE: 14 BRPM | BODY MASS INDEX: 35.88 KG/M2 | TEMPERATURE: 98.4 F | OXYGEN SATURATION: 97 % | WEIGHT: 195 LBS | HEIGHT: 62 IN | HEART RATE: 96 BPM | DIASTOLIC BLOOD PRESSURE: 69 MMHG

## 2024-06-17 DIAGNOSIS — N30.01 ACUTE CYSTITIS WITH HEMATURIA: Primary | ICD-10-CM

## 2024-06-17 LAB
BILIRUB UR STRIP.AUTO-MCNC: NEGATIVE MG/DL
CHARACTER UR: CLEAR
COLOR: YELLOW
GLUCOSE UR QL STRIP.AUTO: NEGATIVE MG/DL
KETONES UR QL STRIP.AUTO: NEGATIVE
NITRITE UR QL STRIP.AUTO: NEGATIVE
PH UR STRIP.AUTO: 5.5 [PH] (ref 5–9)
PROT UR STRIP.AUTO-MCNC: NEGATIVE MG/DL
RBC #/AREA URNS HPF: ABNORMAL /[HPF]
SP GR UR STRIP.AUTO: <= 1.005 (ref 1–1.03)
UROBILINOGEN, URINE: 0.2 EU/DL (ref 0.2–1)
WBC #/AREA URNS HPF: ABNORMAL /[HPF]

## 2024-06-17 PROCEDURE — 87086 URINE CULTURE/COLONY COUNT: CPT

## 2024-06-17 PROCEDURE — 99213 OFFICE O/P EST LOW 20 MIN: CPT

## 2024-06-17 PROCEDURE — 81003 URINALYSIS AUTO W/O SCOPE: CPT

## 2024-06-17 RX ORDER — NITROFURANTOIN 25; 75 MG/1; MG/1
100 CAPSULE ORAL 2 TIMES DAILY
Qty: 14 CAPSULE | Refills: 0 | Status: SHIPPED | OUTPATIENT
Start: 2024-06-17 | End: 2024-06-24

## 2024-06-17 ASSESSMENT — PAIN - FUNCTIONAL ASSESSMENT: PAIN_FUNCTIONAL_ASSESSMENT: NONE - DENIES PAIN

## 2024-06-17 ASSESSMENT — ENCOUNTER SYMPTOMS
RHINORRHEA: 0
COUGH: 0
DIARRHEA: 0
NAUSEA: 0
SORE THROAT: 0
TROUBLE SWALLOWING: 0
SHORTNESS OF BREATH: 0
EYE DISCHARGE: 0
VOMITING: 0
EYE REDNESS: 0

## 2024-06-17 NOTE — DISCHARGE INSTRUCTIONS
Prescribed Macrobid twice daily for 7 days.  Take this medication until completed.  We will call you with your lab results if a change is needed and treatment or additional treatment is needed.  Continue to push oral fluids.  May take Tylenol, ibuprofen, over-the-counter Azo as needed for urinary discomfort.  Follow-up with primary care provider in 3 to 5 days if symptoms worsen or fail to improve.

## 2024-06-17 NOTE — ED PROVIDER NOTES
[facility] URGENT CARE  Urgent Care Encounter      CHIEF COMPLAINT       Chief Complaint   Patient presents with    Dysuria     urgency       Nurses Notes reviewed and I agree except as noted in the HPI.  HISTORY OF PRESENT ILLNESS   Yessenia Elkins is a 64 y.o. female who presents urgent care for evaluation of dysuria and urgency.  Patient reports onset of symptoms yesterday.  Does endorse in urinary frequency.  Denies any vaginal discharge.  Reports she has been trying to drink plenty of water and cranberry juice to reduce symptoms.    REVIEW OF SYSTEMS     Review of Systems   Constitutional:  Negative for chills, diaphoresis, fatigue and fever.   HENT:  Negative for congestion, ear pain, rhinorrhea, sore throat and trouble swallowing.    Eyes:  Negative for discharge and redness.   Respiratory:  Negative for cough and shortness of breath.    Cardiovascular:  Negative for chest pain.   Gastrointestinal:  Negative for diarrhea, nausea and vomiting.   Genitourinary:  Positive for dysuria, frequency and urgency. Negative for decreased urine volume.   Musculoskeletal:  Negative for neck pain and neck stiffness.   Skin:  Negative for rash.   Neurological:  Negative for headaches.   Hematological:  Negative for adenopathy.   Psychiatric/Behavioral:  Negative for sleep disturbance.        PAST MEDICAL HISTORY         Diagnosis Date    Arthritis     Autoimmune hepatitis (HCC)     Depression     Diabetes mellitus (HCC)     Fatty liver     GERD (gastroesophageal reflux disease)     Hypertension     Incontinence     Liver disease     AIH with PBC    Primary biliary cholangitis (HCC)        SURGICAL HISTORY     Patient  has a past surgical history that includes Hysterectomy; Tubal ligation; Bunionectomy; Heel spur surgery; Incontinence surgery; pr egd transoral biopsy single/multiple (N/A, 9/6/2017); Upper gastrointestinal endoscopy (2017); liver biopsy; Colonoscopy; Bunionectomy; Carpal tunnel release (Right); and  Finger trigger release (Right).    CURRENT MEDICATIONS       Discharge Medication List as of 6/17/2024 12:32 PM        CONTINUE these medications which have NOT CHANGED    Details   Semaglutide,0.25 or 0.5MG/DOS, (OZEMPIC, 0.25 OR 0.5 MG/DOSE,) 2 MG/3ML SOPN Inject 0.5 mg into the skin once a week, Disp-3 mL, R-2Normal      FLUoxetine (PROZAC) 10 MG capsule Take 1 capsule by mouth daily, Disp-90 capsule, R-3Normal      lisinopril (PRINIVIL;ZESTRIL) 20 MG tablet Take 1 tablet by mouth daily, Disp-90 tablet, R-3Normal      omeprazole (PRILOSEC) 40 MG delayed release capsule Take 1 capsule by mouth daily, Disp-90 capsule, R-3Normal      metFORMIN (GLUCOPHAGE) 500 MG tablet Take 1 tablet by mouth 2 times daily (with meals), Disp-180 tablet, R-3Normal      budesonide (ENTOCORT EC) 3 MG delayed release capsule Take 2 capsules by mouth 3 times daily, Disp-30 capsule, R-0Historical Med      ursodiol (ACTIGALL) 500 MG tablet Take 0.5 tablets by mouth 3 times daily, Disp-45 tablet, R-3Normal             ALLERGIES     Patient is is allergic to augmentin [amoxicillin-pot clavulanate] and erythromycin.    FAMILY HISTORY     Patient'sfamily history includes Arthritis in her brother; Diabetes in her paternal grandfather; Emphysema in her father; Heart Disease in her brother, father, maternal grandfather, maternal grandmother, paternal grandfather, and paternal grandmother; Hypertension in her paternal grandfather and paternal grandmother; Liver Disease in her father; Other in her mother; Stroke in her father, paternal grandfather, and paternal grandmother.    SOCIAL HISTORY     Patient  reports that she has never smoked. She has never been exposed to tobacco smoke. She has never used smokeless tobacco. She reports that she does not drink alcohol and does not use drugs.    PHYSICAL EXAM     ED TRIAGE VITALS  BP: 119/69, Temp: 98.4 °F (36.9 °C), Pulse: 96, Respirations: 14, SpO2: 97 %  Physical Exam  Vitals and nursing note

## 2024-06-18 ENCOUNTER — TELEPHONE (OUTPATIENT)
Dept: FAMILY MEDICINE CLINIC | Age: 64
End: 2024-06-18

## 2024-06-18 LAB
BACTERIA UR CULT: ABNORMAL
ORGANISM: ABNORMAL

## 2024-06-18 NOTE — TELEPHONE ENCOUNTER
MyChart follow up sent.   Occupational Therapy    Visit Type: treatment  Nursing comments: RN approved OT treatment session on floor mat. RN reports patient is due for a nap at end of therapy session   RN inquired about how to get patient a helmet for head shaping. OT relayed RN's interest to Physical Therapist to address  Present at bedside: Nurse  Precautions: swallow/dysphagia    SUBJECTIVE  No caregiver present. Patient was received awake following bath with RN  Patient / Family Goals: unable to state  Face, Legs, Activity, Cry, Consolability Scale (FLACC)     Face: 0 - No particular expression or smile    Legs: 0 - Normal position or relaxed    Activity: 0 - Lying quietly, normal position, moves easily    Cry: 0 - No cry (awake or asleep)    Consolability: 0 - Content, relaxed    Score: 0    OBJECTIVE       Strength: (scale: WNL, WFL, weak, good, fair, poor, trace, absent)    Comments / Details: Infant is able to sustain sidelying positions with CGA    In prone, infant lifts head to about 25*, though requires proximal support at UE's for stability. Tolerates prone for about 90 seconds     Emerging ability to weight bear through UE's from supported prop-sit      Infant / Toddler Hand Skills:  Grasp:     Briefly retains object placed in hand: present  Release:     Unable to release object: present          Interventions    Positional:  -Prone: forearm weight bearing, rolling to sidelying / supine and head control   -Supine: hands to midline, sidelying with LE dissociation, hands to knees, midline play in sidelying and rolling to sidelying/prone  -Sitting/Transition: prop sit and supported sit  -Voluntary release/grasp using: grasp  Additional activities completed by position detailed in objective above.  -Details: Session completed on floor mat:  - Targeted grasping and batting skills for infant toys. Infant demonstrates increased active movements in hands in response to visual presentation of toy  - Infant sustains grasp > 5 seconds on  toy  - Infant demonstrates attention for looking at age-appropriate book      Sensory/Vision:  -Sensory: vestibular input  -Infant accepts transitions through developmental positions     Neuromuscular Re-Education:   -facilitate symmetry, facilitate visual engagement, facilitate anti-gravity movement, facilitate alignment, facilitate visual tracking and facilitate grasping     Education Completed  - Person instructed: RN  - Education completed: role of OT, current neuromotor and neurobehavioral status of infant, diagnosis/impairments pertaining to rehab and positioning of infant  - Teaching method: verbal instruction/explanation  - Response to education: Verbalizes understanding  - Reviewed results of session       ASSESSMENT    - Impairments: strength, tolerance to handling, activity tolerance, tolerance to positioning, motor control, positioning for feeding, tolerance to sensory stimulation, state regulation, variety of movement, postural control, movement quality, amplitude of movement and head control  - Functional Limitations: play, self regulation, transitional movement, caregiver bonding, impaired sitting, participating in meaningful/purposeful activities, delayed motor milestones and sleep/wake cycle  - Personal Occupations Profile Affected: feeding, sleep participation, social participation family (Play/developmental skills)     Infant seen for OT treatment focusing on floor mat play. Infant tolerated developmental play well and demonstrates emerging fine motor and gross motor skills. Infant will continue to benefit from therapy POC to maiimize his functional outcomes. Recommend ongoing OT per POC to support and progress sensorimotor development.      Discharge Recommendations:  OT Referrals/Discharge Recommendations: Refer to specialty clinic, Refer to early intervention    Skilled therapy is required to address these limitations in attempt to maximize the patient's independence.  - Progress: progressing  toward goals    Pain at end of session:   Face, Legs, Activity, Cry, Consolability (FLACC) at end of session:    Face: 0-No particular expression or smile     Legs: 0-Normal position or relaxed    Activity: 0 - Lying quietly, normal position, moves easily    Cry: 0-No cry (awake or asleep)    Consolability: 0-Content, relaxed    Score: 0       Interpretation: 0=relaxed and comfortable, 1-3=mild discomfort, 4-6=moderate pain, 7-10=severe       discomfort patient or pain or both    End of Session:  Location: rails up and open crib  Safety measures: lines intact and infant safety band  Handoff to: nurse and family/caregiver    PLAN  Suggestions for next session as indicated:   OT Frequency: 2 days/week        Interventions: activity tolerance training, caregiver training, continued evaluation, handling, motor training, positioning, state regulation, coordination, facilitation of symmetrical movement patterns, manual techniques, patient education, sensory stimulation, therapeutic activity, upper extremity strengthening/ROM, neuromuscular reeducation, patient/family training, progress motor skills, use of adaptive equipment, HEP training and therapeutic exercise  Agreement to plan and goals: will attempt to contact parent/caregiver          GOALS    Long Term Goals: (to be met by time of discharge from hospital)  Tolerate: Infant will tolerate position changes, handling, touch and passive ROMwith stable vitals Status: progressing/ongoing  Sleep wake states: Infant will demonstrate autonomic, motor, and sensory regulation during sleep and wake states 80% of the time Status: progressing/ongoing  Caregiver HEP: Caregiver/family will be independent with positioning, ability to recognize and encourage infant's developmental behaviors and HEP Status: progressing/ongoing    Infant will sustain grasp on ring for 10 seconds following placement in hand. (Goal met)    Infant will sustain grasp on 1\" cube for 5 seconds s/p placement  in hand (NEW GOAL)  Documented in the chart in the following areas: Assessment/Plan.      Therapy procedure time and total treatment time can be found documented on the Time Entry flowsheet

## 2024-08-19 SDOH — HEALTH STABILITY: PHYSICAL HEALTH: ON AVERAGE, HOW MANY DAYS PER WEEK DO YOU ENGAGE IN MODERATE TO STRENUOUS EXERCISE (LIKE A BRISK WALK)?: 0 DAYS

## 2024-08-19 ASSESSMENT — PATIENT HEALTH QUESTIONNAIRE - PHQ9
SUM OF ALL RESPONSES TO PHQ QUESTIONS 1-9: 0
SUM OF ALL RESPONSES TO PHQ QUESTIONS 1-9: 0
SUM OF ALL RESPONSES TO PHQ9 QUESTIONS 1 & 2: 0
SUM OF ALL RESPONSES TO PHQ QUESTIONS 1-9: 0
SUM OF ALL RESPONSES TO PHQ QUESTIONS 1-9: 0
1. LITTLE INTEREST OR PLEASURE IN DOING THINGS: NOT AT ALL
2. FEELING DOWN, DEPRESSED OR HOPELESS: NOT AT ALL

## 2024-08-19 ASSESSMENT — LIFESTYLE VARIABLES
HOW OFTEN DO YOU HAVE SIX OR MORE DRINKS ON ONE OCCASION: 1
HOW OFTEN DO YOU HAVE A DRINK CONTAINING ALCOHOL: 1
HOW MANY STANDARD DRINKS CONTAINING ALCOHOL DO YOU HAVE ON A TYPICAL DAY: PATIENT DOES NOT DRINK
HOW OFTEN DO YOU HAVE A DRINK CONTAINING ALCOHOL: NEVER
HOW MANY STANDARD DRINKS CONTAINING ALCOHOL DO YOU HAVE ON A TYPICAL DAY: 0

## 2024-08-22 ENCOUNTER — LAB (OUTPATIENT)
Dept: LAB | Age: 64
End: 2024-08-22

## 2024-08-22 ENCOUNTER — OFFICE VISIT (OUTPATIENT)
Dept: FAMILY MEDICINE CLINIC | Age: 64
End: 2024-08-22

## 2024-08-22 VITALS
HEART RATE: 72 BPM | DIASTOLIC BLOOD PRESSURE: 66 MMHG | SYSTOLIC BLOOD PRESSURE: 102 MMHG | RESPIRATION RATE: 16 BRPM | WEIGHT: 190.4 LBS | BODY MASS INDEX: 35.04 KG/M2 | HEIGHT: 62 IN

## 2024-08-22 DIAGNOSIS — E11.9 TYPE 2 DIABETES MELLITUS WITHOUT COMPLICATION, WITHOUT LONG-TERM CURRENT USE OF INSULIN (HCC): ICD-10-CM

## 2024-08-22 DIAGNOSIS — N39.0 ACUTE UTI: ICD-10-CM

## 2024-08-22 DIAGNOSIS — K75.4 AUTOIMMUNE HEPATITIS (HCC): ICD-10-CM

## 2024-08-22 DIAGNOSIS — Z00.00 WELCOME TO MEDICARE PREVENTIVE VISIT: Primary | ICD-10-CM

## 2024-08-22 DIAGNOSIS — R30.0 DYSURIA: ICD-10-CM

## 2024-08-22 DIAGNOSIS — I10 ESSENTIAL HYPERTENSION: ICD-10-CM

## 2024-08-22 DIAGNOSIS — R79.89 ABNORMAL THYROID BLOOD TEST: ICD-10-CM

## 2024-08-22 DIAGNOSIS — E78.00 PURE HYPERCHOLESTEROLEMIA: ICD-10-CM

## 2024-08-22 PROBLEM — K21.9 GASTROESOPHAGEAL REFLUX DISEASE WITHOUT ESOPHAGITIS: Status: ACTIVE | Noted: 2023-09-20

## 2024-08-22 PROBLEM — F33.0 MILD EPISODE OF RECURRENT MAJOR DEPRESSIVE DISORDER (HCC): Status: ACTIVE | Noted: 2023-09-20

## 2024-08-22 PROBLEM — J06.9 VIRAL UPPER RESPIRATORY TRACT INFECTION: Status: ACTIVE | Noted: 2024-01-23

## 2024-08-22 PROBLEM — L03.039 CELLULITIS OF TOE: Status: ACTIVE | Noted: 2023-09-23

## 2024-08-22 PROBLEM — K76.89 AUTOIMMUNE DISEASE OF LIVER: Status: ACTIVE | Noted: 2023-09-20

## 2024-08-22 LAB
ANION GAP SERPL CALC-SCNC: 12 MEQ/L (ref 8–16)
BILIRUBIN, POC: ABNORMAL
BLOOD URINE, POC: ABNORMAL
BUN SERPL-MCNC: 13 MG/DL (ref 7–22)
CALCIUM SERPL-MCNC: 9.5 MG/DL (ref 8.5–10.5)
CHLORIDE SERPL-SCNC: 101 MEQ/L (ref 98–111)
CLARITY, POC: ABNORMAL
CO2 SERPL-SCNC: 26 MEQ/L (ref 23–33)
COLOR, POC: ABNORMAL
CREAT SERPL-MCNC: 0.7 MG/DL (ref 0.4–1.2)
DEPRECATED MEAN GLUCOSE BLD GHB EST-ACNC: 135 MG/DL (ref 70–126)
GFR SERPL CREATININE-BSD FRML MDRD: > 90 ML/MIN/1.73M2
GLUCOSE SERPL-MCNC: 158 MG/DL (ref 70–108)
GLUCOSE URINE, POC: ABNORMAL
HBA1C MFR BLD HPLC: 6.5 % (ref 4.4–6.4)
KETONES, POC: ABNORMAL
LEUKOCYTE EST, POC: ABNORMAL
NITRITE, POC: ABNORMAL
PH, POC: 5
POTASSIUM SERPL-SCNC: 4.2 MEQ/L (ref 3.5–5.2)
PROTEIN, POC: ABNORMAL
SODIUM SERPL-SCNC: 139 MEQ/L (ref 135–145)
SPECIFIC GRAVITY, POC: 1.02
UROBILINOGEN, POC: 1

## 2024-08-22 RX ORDER — CIPROFLOXACIN 250 MG/1
250 TABLET, FILM COATED ORAL 2 TIMES DAILY
Qty: 10 TABLET | Refills: 0 | Status: SHIPPED | OUTPATIENT
Start: 2024-08-22 | End: 2024-08-27

## 2024-08-22 RX ORDER — TIRZEPATIDE 2.5 MG/.5ML
2.5 INJECTION, SOLUTION SUBCUTANEOUS WEEKLY
Qty: 2 ML | Refills: 0 | Status: SHIPPED | OUTPATIENT
Start: 2024-08-22

## 2024-08-22 ASSESSMENT — PATIENT HEALTH QUESTIONNAIRE - PHQ9
SUM OF ALL RESPONSES TO PHQ QUESTIONS 1-9: 0
10. IF YOU CHECKED OFF ANY PROBLEMS, HOW DIFFICULT HAVE THESE PROBLEMS MADE IT FOR YOU TO DO YOUR WORK, TAKE CARE OF THINGS AT HOME, OR GET ALONG WITH OTHER PEOPLE: NOT DIFFICULT AT ALL
4. FEELING TIRED OR HAVING LITTLE ENERGY: NOT AT ALL
5. POOR APPETITE OR OVEREATING: NOT AT ALL
SUM OF ALL RESPONSES TO PHQ9 QUESTIONS 1 & 2: 0
9. THOUGHTS THAT YOU WOULD BE BETTER OFF DEAD, OR OF HURTING YOURSELF: NOT AT ALL
1. LITTLE INTEREST OR PLEASURE IN DOING THINGS: NOT AT ALL
7. TROUBLE CONCENTRATING ON THINGS, SUCH AS READING THE NEWSPAPER OR WATCHING TELEVISION: NOT AT ALL
SUM OF ALL RESPONSES TO PHQ QUESTIONS 1-9: 0
2. FEELING DOWN, DEPRESSED OR HOPELESS: NOT AT ALL
6. FEELING BAD ABOUT YOURSELF - OR THAT YOU ARE A FAILURE OR HAVE LET YOURSELF OR YOUR FAMILY DOWN: NOT AT ALL
SUM OF ALL RESPONSES TO PHQ QUESTIONS 1-9: 0
SUM OF ALL RESPONSES TO PHQ QUESTIONS 1-9: 0
8. MOVING OR SPEAKING SO SLOWLY THAT OTHER PEOPLE COULD HAVE NOTICED. OR THE OPPOSITE, BEING SO FIGETY OR RESTLESS THAT YOU HAVE BEEN MOVING AROUND A LOT MORE THAN USUAL: NOT AT ALL
3. TROUBLE FALLING OR STAYING ASLEEP: NOT AT ALL

## 2024-08-22 ASSESSMENT — ENCOUNTER SYMPTOMS
ABDOMINAL PAIN: 0
NAUSEA: 0
COUGH: 0
SHORTNESS OF BREATH: 0

## 2024-08-22 ASSESSMENT — LIFESTYLE VARIABLES
HOW MANY STANDARD DRINKS CONTAINING ALCOHOL DO YOU HAVE ON A TYPICAL DAY: PATIENT DOES NOT DRINK
HOW OFTEN DO YOU HAVE A DRINK CONTAINING ALCOHOL: NEVER

## 2024-08-22 NOTE — PATIENT INSTRUCTIONS
Cranberry Supplement or D-Mannose Supplement    You may receive a survey regarding the care you received during your visit.  Your input is valuable to us.  We encourage you to complete and return your survey.  We hope you will choose us in the future for your healthcare needs.         Preventing Falls: Care Instructions  Injuries and health problems such as trouble walking or poor eyesight can increase your risk of falling. So can some medicines. But there are things you can do to help prevent falls. You can exercise to get stronger. You can also arrange your home to make it safer.    Talk to your doctor about the medicines you take. Ask if any of them increase the risk of falls and whether they can be changed or stopped.   Try to exercise regularly. It can help improve your strength and balance. This can help lower your risk of falling.         Practice fall safety and prevention.   Wear low-heeled shoes that fit well and give your feet good support. Talk to your doctor if you have foot problems that make this hard.  Carry a cellphone or wear a medical alert device that you can use to call for help.  Use stepladders instead of chairs to reach high objects. Don't climb if you're at risk for falls. Ask for help, if needed.  Wear the correct eyeglasses, if you need them.        Make your home safer.   Remove rugs, cords, clutter, and furniture from walkways.  Keep your house well lit. Use night-lights in hallways and bathrooms.  Install and use sturdy handrails on stairways.  Wear nonskid footwear, even inside. Don't walk barefoot or in socks without shoes.        Be safe outside.   Use handrails, curb cuts, and ramps whenever possible.  Keep your hands free by using a shoulder bag or backpack.  Try to walk in well-lit areas. Watch out for uneven ground, changes in pavement, and debris.  Be careful in the winter. Walk on the grass or gravel when sidewalks are slippery. Use de-icer on steps and walkways. Add non-slip

## 2024-08-24 LAB
BACTERIA UR CULT: ABNORMAL
ORGANISM: ABNORMAL

## 2024-09-13 DIAGNOSIS — E11.9 TYPE 2 DIABETES MELLITUS WITHOUT COMPLICATION, WITHOUT LONG-TERM CURRENT USE OF INSULIN (HCC): Primary | ICD-10-CM

## 2024-09-13 RX ORDER — TIRZEPATIDE 5 MG/.5ML
5 INJECTION, SOLUTION SUBCUTANEOUS WEEKLY
Qty: 2 ML | Refills: 0 | Status: SHIPPED | OUTPATIENT
Start: 2024-09-13

## 2024-09-13 RX ORDER — TIRZEPATIDE 2.5 MG/.5ML
INJECTION, SOLUTION SUBCUTANEOUS
Qty: 4 ML | Refills: 0 | OUTPATIENT
Start: 2024-09-13

## 2024-10-03 ENCOUNTER — COMMUNITY OUTREACH (OUTPATIENT)
Dept: FAMILY MEDICINE CLINIC | Age: 64
End: 2024-10-03

## 2024-10-03 NOTE — PROGRESS NOTES
Patient's HM shows they are overdue for Mammogram.  Care Everywhere and  files searched.   updated with 2023 mammogram.

## 2024-10-09 DIAGNOSIS — E11.9 TYPE 2 DIABETES MELLITUS WITHOUT COMPLICATION, WITHOUT LONG-TERM CURRENT USE OF INSULIN (HCC): ICD-10-CM

## 2024-10-09 RX ORDER — TIRZEPATIDE 7.5 MG/.5ML
7.5 INJECTION, SOLUTION SUBCUTANEOUS WEEKLY
Qty: 2 ML | Refills: 0 | Status: SHIPPED | OUTPATIENT
Start: 2024-10-09

## 2024-11-02 DIAGNOSIS — E11.9 TYPE 2 DIABETES MELLITUS WITHOUT COMPLICATION, WITHOUT LONG-TERM CURRENT USE OF INSULIN (HCC): ICD-10-CM

## 2024-11-04 RX ORDER — TIRZEPATIDE 10 MG/.5ML
10 INJECTION, SOLUTION SUBCUTANEOUS WEEKLY
Qty: 2 ML | Refills: 0 | Status: SHIPPED | OUTPATIENT
Start: 2024-11-04

## 2024-11-12 ENCOUNTER — TELEMEDICINE (OUTPATIENT)
Dept: FAMILY MEDICINE CLINIC | Age: 64
End: 2024-11-12

## 2024-11-12 ENCOUNTER — PATIENT MESSAGE (OUTPATIENT)
Dept: FAMILY MEDICINE CLINIC | Age: 64
End: 2024-11-12

## 2024-11-12 DIAGNOSIS — F33.0 MILD EPISODE OF RECURRENT MAJOR DEPRESSIVE DISORDER (HCC): ICD-10-CM

## 2024-11-12 DIAGNOSIS — N39.0 ACUTE UTI: Primary | ICD-10-CM

## 2024-11-12 RX ORDER — CIPROFLOXACIN 250 MG/1
250 TABLET, FILM COATED ORAL 2 TIMES DAILY
Qty: 10 TABLET | Refills: 0 | Status: SHIPPED | OUTPATIENT
Start: 2024-11-12 | End: 2024-11-17

## 2024-11-12 ASSESSMENT — ENCOUNTER SYMPTOMS
ABDOMINAL PAIN: 0
SHORTNESS OF BREATH: 0
COUGH: 0
NAUSEA: 0

## 2024-11-12 NOTE — PROGRESS NOTES
place, and time.   Psychiatric:         Mood and Affect: Mood normal.         Behavior: Behavior normal.         Assessment:       Diagnosis Orders   1. Acute UTI  ciprofloxacin (CIPRO) 250 MG tablet      2. Mild episode of recurrent major depressive disorder (HCC)            Plan:     Orders as above   Fluids and rest  RTO if symptoms worsen or stay the same          Yessenia Elkins, was evaluated through a synchronous (real-time) audio-video encounter. The patient (or guardian if applicable) is aware that this is a billable service, which includes applicable co-pays. This Virtual Visit was conducted with patient's (and/or legal guardian's) consent. Patient identification was verified, and a caregiver was present when appropriate.   The patient was located at Home: [address]  [place] [address]  Provider was located at Facility (Appt Dept): [address]  [place] [address]  Confirm you are appropriately licensed, registered, or certified to deliver care in the state where the patient is located as indicated above. If you are not or unsure, please re-schedule the visit: Yes, I confirm.        Total time spent for this encounter: Not billed by time    --VIVIEN Velazquez - CNP on 11/12/2024 at 9:58 AM    An electronic signature was used to authenticate this note.

## 2024-11-20 ENCOUNTER — PATIENT MESSAGE (OUTPATIENT)
Dept: FAMILY MEDICINE CLINIC | Age: 64
End: 2024-11-20

## 2024-11-20 DIAGNOSIS — E11.9 TYPE 2 DIABETES MELLITUS WITHOUT COMPLICATION, WITHOUT LONG-TERM CURRENT USE OF INSULIN (HCC): Primary | ICD-10-CM

## 2024-11-20 RX ORDER — TIRZEPATIDE 5 MG/.5ML
5 INJECTION, SOLUTION SUBCUTANEOUS WEEKLY
Qty: 2 ML | Refills: 3 | Status: SHIPPED | OUTPATIENT
Start: 2024-11-20

## 2025-01-29 ENCOUNTER — TELEMEDICINE (OUTPATIENT)
Dept: FAMILY MEDICINE CLINIC | Age: 65
End: 2025-01-29

## 2025-01-29 DIAGNOSIS — K75.4 AUTOIMMUNE HEPATITIS (HCC): ICD-10-CM

## 2025-01-29 DIAGNOSIS — E11.9 TYPE 2 DIABETES MELLITUS WITHOUT COMPLICATION, WITHOUT LONG-TERM CURRENT USE OF INSULIN (HCC): ICD-10-CM

## 2025-01-29 DIAGNOSIS — J01.90 ACUTE BACTERIAL SINUSITIS: Primary | ICD-10-CM

## 2025-01-29 DIAGNOSIS — B96.89 ACUTE BACTERIAL SINUSITIS: Primary | ICD-10-CM

## 2025-01-29 RX ORDER — DOXYCYCLINE HYCLATE 100 MG
100 TABLET ORAL 2 TIMES DAILY
Qty: 20 TABLET | Refills: 0 | Status: SHIPPED | OUTPATIENT
Start: 2025-01-29 | End: 2025-02-08

## 2025-01-29 ASSESSMENT — ENCOUNTER SYMPTOMS
NAUSEA: 0
SINUS PAIN: 1
RHINORRHEA: 1
COUGH: 0
SINUS PRESSURE: 1
SHORTNESS OF BREATH: 0
ABDOMINAL PAIN: 0

## 2025-01-29 NOTE — PROGRESS NOTES
Subjective:      Patient ID: Yessenia Elkins is a 65 y.o. female    HPI: Acute for sinuses    TELEHEALTH EVALUATION -- Audio/Visual     Patient identification was verified at the start of the visit: Yes    Services were provided through a video synchronous discussion virtually to substitute for in-person clinic visit. Patient and provider were located at their individual homes.    Patient has requested an audio/video evaluation for the following concern(s):    Chief Complaint   Patient presents with    Sinusitis       Onset 2 weeks with sinus complaints.  Runny nose, color drainage, facial pain.  TTP over cheeks.  Sinus HA.    PND.      Denies cough.  Denies CP, SOB or chest tightness    OTC: Dayquil, Nyquil, Advil Sinus    Patient Active Problem List   Diagnosis    Epigastric abdominal pain    High anion gap metabolic acidosis    Essential hypertension    Type 2 diabetes mellitus without complication (HCC)    Elevated LFTs    Ulnar neuritis, right    Pure hypercholesterolemia    Primary biliary cholangitis (HCC)    Anxiety disorder    Autoimmune hepatitis (HCC)    Cellulitis of toe    Gastroesophageal reflux disease without esophagitis    Mild episode of recurrent major depressive disorder (HCC)    Viral upper respiratory tract infection       Review of Systems   Constitutional:  Positive for fatigue. Negative for chills and fever.   HENT:  Positive for congestion, postnasal drip, rhinorrhea, sinus pressure and sinus pain.    Respiratory:  Negative for cough and shortness of breath.    Cardiovascular:  Negative for chest pain.   Gastrointestinal:  Negative for abdominal pain and nausea.   Skin:  Negative for rash.   Neurological:  Positive for headaches. Negative for dizziness and light-headedness.   Psychiatric/Behavioral: Negative.         Objective:   Physical Exam  Constitutional:       General: She is not in acute distress.     Appearance: She is not ill-appearing.   Pulmonary:      Effort: Pulmonary effort is

## 2025-02-18 SDOH — ECONOMIC STABILITY: TRANSPORTATION INSECURITY
IN THE PAST 12 MONTHS, HAS THE LACK OF TRANSPORTATION KEPT YOU FROM MEDICAL APPOINTMENTS OR FROM GETTING MEDICATIONS?: NO

## 2025-02-18 SDOH — ECONOMIC STABILITY: FOOD INSECURITY: WITHIN THE PAST 12 MONTHS, YOU WORRIED THAT YOUR FOOD WOULD RUN OUT BEFORE YOU GOT MONEY TO BUY MORE.: NEVER TRUE

## 2025-02-18 SDOH — ECONOMIC STABILITY: FOOD INSECURITY: WITHIN THE PAST 12 MONTHS, THE FOOD YOU BOUGHT JUST DIDN'T LAST AND YOU DIDN'T HAVE MONEY TO GET MORE.: NEVER TRUE

## 2025-02-18 SDOH — ECONOMIC STABILITY: TRANSPORTATION INSECURITY
IN THE PAST 12 MONTHS, HAS LACK OF TRANSPORTATION KEPT YOU FROM MEETINGS, WORK, OR FROM GETTING THINGS NEEDED FOR DAILY LIVING?: NO

## 2025-02-18 SDOH — ECONOMIC STABILITY: INCOME INSECURITY: IN THE LAST 12 MONTHS, WAS THERE A TIME WHEN YOU WERE NOT ABLE TO PAY THE MORTGAGE OR RENT ON TIME?: NO

## 2025-02-18 ASSESSMENT — PATIENT HEALTH QUESTIONNAIRE - PHQ9
10. IF YOU CHECKED OFF ANY PROBLEMS, HOW DIFFICULT HAVE THESE PROBLEMS MADE IT FOR YOU TO DO YOUR WORK, TAKE CARE OF THINGS AT HOME, OR GET ALONG WITH OTHER PEOPLE: NOT DIFFICULT AT ALL
SUM OF ALL RESPONSES TO PHQ9 QUESTIONS 1 & 2: 0
SUM OF ALL RESPONSES TO PHQ QUESTIONS 1-9: 2
5. POOR APPETITE OR OVEREATING: NOT AT ALL
1. LITTLE INTEREST OR PLEASURE IN DOING THINGS: NOT AT ALL
SUM OF ALL RESPONSES TO PHQ QUESTIONS 1-9: 2
3. TROUBLE FALLING OR STAYING ASLEEP: SEVERAL DAYS
1. LITTLE INTEREST OR PLEASURE IN DOING THINGS: NOT AT ALL
9. THOUGHTS THAT YOU WOULD BE BETTER OFF DEAD, OR OF HURTING YOURSELF: NOT AT ALL
2. FEELING DOWN, DEPRESSED OR HOPELESS: NOT AT ALL
5. POOR APPETITE OR OVEREATING: NOT AT ALL
8. MOVING OR SPEAKING SO SLOWLY THAT OTHER PEOPLE COULD HAVE NOTICED. OR THE OPPOSITE, BEING SO FIGETY OR RESTLESS THAT YOU HAVE BEEN MOVING AROUND A LOT MORE THAN USUAL: NOT AT ALL
SUM OF ALL RESPONSES TO PHQ QUESTIONS 1-9: 2
6. FEELING BAD ABOUT YOURSELF - OR THAT YOU ARE A FAILURE OR HAVE LET YOURSELF OR YOUR FAMILY DOWN: NOT AT ALL
9. THOUGHTS THAT YOU WOULD BE BETTER OFF DEAD, OR OF HURTING YOURSELF: NOT AT ALL
6. FEELING BAD ABOUT YOURSELF - OR THAT YOU ARE A FAILURE OR HAVE LET YOURSELF OR YOUR FAMILY DOWN: NOT AT ALL
4. FEELING TIRED OR HAVING LITTLE ENERGY: SEVERAL DAYS
10. IF YOU CHECKED OFF ANY PROBLEMS, HOW DIFFICULT HAVE THESE PROBLEMS MADE IT FOR YOU TO DO YOUR WORK, TAKE CARE OF THINGS AT HOME, OR GET ALONG WITH OTHER PEOPLE: NOT DIFFICULT AT ALL
7. TROUBLE CONCENTRATING ON THINGS, SUCH AS READING THE NEWSPAPER OR WATCHING TELEVISION: NOT AT ALL
7. TROUBLE CONCENTRATING ON THINGS, SUCH AS READING THE NEWSPAPER OR WATCHING TELEVISION: NOT AT ALL
SUM OF ALL RESPONSES TO PHQ QUESTIONS 1-9: 2
3. TROUBLE FALLING OR STAYING ASLEEP: SEVERAL DAYS
8. MOVING OR SPEAKING SO SLOWLY THAT OTHER PEOPLE COULD HAVE NOTICED. OR THE OPPOSITE - BEING SO FIDGETY OR RESTLESS THAT YOU HAVE BEEN MOVING AROUND A LOT MORE THAN USUAL: NOT AT ALL
2. FEELING DOWN, DEPRESSED OR HOPELESS: NOT AT ALL
4. FEELING TIRED OR HAVING LITTLE ENERGY: SEVERAL DAYS
SUM OF ALL RESPONSES TO PHQ QUESTIONS 1-9: 2

## 2025-02-20 ENCOUNTER — OFFICE VISIT (OUTPATIENT)
Dept: FAMILY MEDICINE CLINIC | Age: 65
End: 2025-02-20
Payer: MEDICARE

## 2025-02-20 VITALS
RESPIRATION RATE: 16 BRPM | WEIGHT: 165.5 LBS | HEART RATE: 84 BPM | BODY MASS INDEX: 30.27 KG/M2 | DIASTOLIC BLOOD PRESSURE: 74 MMHG | SYSTOLIC BLOOD PRESSURE: 128 MMHG | TEMPERATURE: 97.7 F

## 2025-02-20 DIAGNOSIS — F33.0 MILD EPISODE OF RECURRENT MAJOR DEPRESSIVE DISORDER: ICD-10-CM

## 2025-02-20 DIAGNOSIS — B96.89 ACUTE BACTERIAL SINUSITIS: ICD-10-CM

## 2025-02-20 DIAGNOSIS — I10 ESSENTIAL HYPERTENSION: ICD-10-CM

## 2025-02-20 DIAGNOSIS — K75.4 AUTOIMMUNE HEPATITIS (HCC): ICD-10-CM

## 2025-02-20 DIAGNOSIS — J01.90 ACUTE BACTERIAL SINUSITIS: ICD-10-CM

## 2025-02-20 DIAGNOSIS — E11.9 TYPE 2 DIABETES MELLITUS WITHOUT COMPLICATION, WITHOUT LONG-TERM CURRENT USE OF INSULIN (HCC): Primary | ICD-10-CM

## 2025-02-20 DIAGNOSIS — E78.00 PURE HYPERCHOLESTEROLEMIA: ICD-10-CM

## 2025-02-20 DIAGNOSIS — R79.89 ABNORMAL THYROID BLOOD TEST: ICD-10-CM

## 2025-02-20 PROCEDURE — 1159F MED LIST DOCD IN RCRD: CPT | Performed by: NURSE PRACTITIONER

## 2025-02-20 PROCEDURE — 3074F SYST BP LT 130 MM HG: CPT | Performed by: NURSE PRACTITIONER

## 2025-02-20 PROCEDURE — 99214 OFFICE O/P EST MOD 30 MIN: CPT | Performed by: NURSE PRACTITIONER

## 2025-02-20 PROCEDURE — 3078F DIAST BP <80 MM HG: CPT | Performed by: NURSE PRACTITIONER

## 2025-02-20 PROCEDURE — G2211 COMPLEX E/M VISIT ADD ON: HCPCS | Performed by: NURSE PRACTITIONER

## 2025-02-20 PROCEDURE — 1123F ACP DISCUSS/DSCN MKR DOCD: CPT | Performed by: NURSE PRACTITIONER

## 2025-02-20 RX ORDER — LISINOPRIL 20 MG/1
20 TABLET ORAL DAILY
Qty: 90 TABLET | Refills: 3 | Status: SHIPPED | OUTPATIENT
Start: 2025-02-20

## 2025-02-20 RX ORDER — TIRZEPATIDE 5 MG/.5ML
5 INJECTION, SOLUTION SUBCUTANEOUS WEEKLY
Qty: 2 ML | Refills: 5 | Status: SHIPPED | OUTPATIENT
Start: 2025-02-20

## 2025-02-20 RX ORDER — LEVOFLOXACIN 500 MG/1
500 TABLET, FILM COATED ORAL DAILY
Qty: 10 TABLET | Refills: 0 | Status: SHIPPED | OUTPATIENT
Start: 2025-02-20 | End: 2025-03-02

## 2025-02-20 RX ORDER — FLUOXETINE 10 MG/1
10 CAPSULE ORAL DAILY
Qty: 90 CAPSULE | Refills: 3 | Status: SHIPPED | OUTPATIENT
Start: 2025-02-20

## 2025-02-20 RX ORDER — OMEPRAZOLE 40 MG/1
40 CAPSULE, DELAYED RELEASE ORAL DAILY
Qty: 90 CAPSULE | Refills: 3 | Status: SHIPPED | OUTPATIENT
Start: 2025-02-20

## 2025-02-20 ASSESSMENT — ENCOUNTER SYMPTOMS
NAUSEA: 0
SINUS PAIN: 1
SHORTNESS OF BREATH: 0
ABDOMINAL PAIN: 0
COUGH: 0
SINUS PRESSURE: 1
RHINORRHEA: 1

## 2025-02-20 NOTE — PROGRESS NOTES
Subjective:      Patient ID: Yessenia Elkins 1960 is a 65 y.o. female here for evaluation.       Chief Complaint   Patient presents with    6 Month Follow-Up     DM    Medication Refill    Nasal Congestion     Ongoing nasal congestion and PND    Referral - General     Pt sees [facility] for her liver, they will no longer take her insurance [employer]. So pt is asking for a local referral instead       Ongoing x 1 month with sinus congestion.   Seen 2 weeks ago with Doxycycline for sinus.  Continues pain and pressure.  Color phlegm.     Patient Active Problem List   Diagnosis    Epigastric abdominal pain    High anion gap metabolic acidosis    Essential hypertension    Type 2 diabetes mellitus without complication (HCC)    Elevated LFTs    Ulnar neuritis, right    Pure hypercholesterolemia    Primary biliary cholangitis (HCC)    Anxiety disorder    Autoimmune hepatitis (HCC)    Cellulitis of toe    Gastroesophageal reflux disease without esophagitis    Mild episode of recurrent major depressive disorder    Viral upper respiratory tract infection       COLONOSCOPY - 2018  MAMMO - 2023  DEXA - 2023    GASTRO/LIVER - Dr. Herman    CBD and Autoimmune Hepatitis.   Following with [facility].  Needs to see local GASTRO due to [facility] not taking [employer].     Denies CP, SOB or chest tightness.  No smoking.     Vitals:    02/20/25 0918   BP: 128/74   Pulse: 84   Resp: 16   Temp: 97.7 °F (36.5 °C)        ON Lisinopril 20 mg    BP Readings from Last 3 Encounters:   02/20/25 128/74   08/22/24 102/66   06/17/24 119/69       Wt Readings from Last 3 Encounters:   02/20/25 75.1 kg (165 lb 8 oz)   08/22/24 86.4 kg (190 lb 6.4 oz)   06/17/24 88.5 kg (195 lb)       SE with Ozempic and metformin.  On Mounajro 5 mg.   30# lost!!    Labs reviewed.     Lab Results   Component Value Date    LABA1C 6.5 (H) 08/22/2024    LABA1C 5.0 05/26/2020     Lab Results   Component Value Date     (H) 08/22/2024       No components found for: \"CHLPL\"  Lab Results

## 2025-02-21 ENCOUNTER — LAB (OUTPATIENT)
Dept: LAB | Age: 65
End: 2025-02-21

## 2025-02-21 DIAGNOSIS — E11.9 TYPE 2 DIABETES MELLITUS WITHOUT COMPLICATION, WITHOUT LONG-TERM CURRENT USE OF INSULIN (HCC): ICD-10-CM

## 2025-02-21 DIAGNOSIS — E78.00 PURE HYPERCHOLESTEROLEMIA: ICD-10-CM

## 2025-02-21 DIAGNOSIS — R79.89 ABNORMAL THYROID BLOOD TEST: ICD-10-CM

## 2025-02-21 LAB
ALBUMIN SERPL BCG-MCNC: 4.4 G/DL (ref 3.5–5.1)
ALP SERPL-CCNC: 669 U/L (ref 38–126)
ALT SERPL W/O P-5'-P-CCNC: 69 U/L (ref 11–66)
ANION GAP SERPL CALC-SCNC: 18 MEQ/L (ref 8–16)
AST SERPL-CCNC: 106 U/L (ref 5–40)
BASOPHILS ABSOLUTE: 0.1 THOU/MM3 (ref 0–0.1)
BASOPHILS NFR BLD AUTO: 0.9 %
BILIRUB SERPL-MCNC: 1.6 MG/DL (ref 0.3–1.2)
BUN SERPL-MCNC: 17 MG/DL (ref 7–22)
CALCIUM SERPL-MCNC: 9.7 MG/DL (ref 8.2–9.6)
CHLORIDE SERPL-SCNC: 100 MEQ/L (ref 98–111)
CHOLEST SERPL-MCNC: 284 MG/DL (ref 100–199)
CO2 SERPL-SCNC: 25 MEQ/L (ref 23–33)
CREAT SERPL-MCNC: 0.6 MG/DL (ref 0.4–1.2)
CREAT UR-MCNC: 148.4 MG/DL
DEPRECATED MEAN GLUCOSE BLD GHB EST-ACNC: 87 MG/DL (ref 70–126)
DEPRECATED RDW RBC AUTO: 52.8 FL (ref 35–45)
EOSINOPHIL NFR BLD AUTO: 4.4 %
EOSINOPHILS ABSOLUTE: 0.3 THOU/MM3 (ref 0–0.4)
ERYTHROCYTE [DISTWIDTH] IN BLOOD BY AUTOMATED COUNT: 15.1 % (ref 11.5–14.5)
GFR SERPL CREATININE-BSD FRML MDRD: > 90 ML/MIN/1.73M2
GLUCOSE SERPL-MCNC: 98 MG/DL (ref 70–108)
HBA1C MFR BLD HPLC: 4.9 % (ref 4.4–6.4)
HCT VFR BLD AUTO: 47.8 % (ref 37–47)
HDLC SERPL-MCNC: 56 MG/DL
HGB BLD-MCNC: 15.3 GM/DL (ref 12–16)
IMM GRANULOCYTES # BLD AUTO: 0.01 THOU/MM3 (ref 0–0.07)
IMM GRANULOCYTES NFR BLD AUTO: 0.1 %
LDLC SERPL CALC-MCNC: 198 MG/DL
LYMPHOCYTES ABSOLUTE: 2.5 THOU/MM3 (ref 1–4.8)
LYMPHOCYTES NFR BLD AUTO: 32.6 %
MCH RBC QN AUTO: 30.2 PG (ref 26–33)
MCHC RBC AUTO-ENTMCNC: 32 GM/DL (ref 32.2–35.5)
MCV RBC AUTO: 94.5 FL (ref 81–99)
MICROALBUMIN UR-MCNC: < 1.2 MG/DL
MICROALBUMIN/CREAT RATIO PNL UR: 8 MG/G (ref 0–30)
MONOCYTES ABSOLUTE: 0.6 THOU/MM3 (ref 0.4–1.3)
MONOCYTES NFR BLD AUTO: 7.5 %
NEUTROPHILS ABSOLUTE: 4.2 THOU/MM3 (ref 1.8–7.7)
NEUTROPHILS NFR BLD AUTO: 54.5 %
NRBC BLD AUTO-RTO: 0 /100 WBC
PLATELET # BLD AUTO: 162 THOU/MM3 (ref 130–400)
PMV BLD AUTO: 12 FL (ref 9.4–12.4)
POTASSIUM SERPL-SCNC: 3.9 MEQ/L (ref 3.5–5.2)
PROT SERPL-MCNC: 7.7 G/DL (ref 6.1–8)
RBC # BLD AUTO: 5.06 MILL/MM3 (ref 4.2–5.4)
SODIUM SERPL-SCNC: 143 MEQ/L (ref 135–145)
T4 FREE SERPL-MCNC: 0.85 NG/DL (ref 0.92–1.68)
TRIGL SERPL-MCNC: 148 MG/DL (ref 0–199)
TSH SERPL DL<=0.005 MIU/L-ACNC: 3.15 UIU/ML (ref 0.4–4.2)
WBC # BLD AUTO: 7.7 THOU/MM3 (ref 4.8–10.8)

## 2025-02-24 LAB
THYROGLOBULIN ANTIBODY: 18 IU/ML (ref 0–40)
THYROPEROXIDASE AB SERPL IA-ACNC: < 4 IU/ML (ref 0–25)

## 2025-02-25 ENCOUNTER — PATIENT MESSAGE (OUTPATIENT)
Dept: FAMILY MEDICINE CLINIC | Age: 65
End: 2025-02-25

## 2025-02-25 DIAGNOSIS — E03.8 SUBCLINICAL HYPOTHYROIDISM: Primary | ICD-10-CM

## 2025-02-25 RX ORDER — LEVOTHYROXINE SODIUM 50 UG/1
50 TABLET ORAL DAILY
Qty: 90 TABLET | Refills: 1 | Status: SHIPPED | OUTPATIENT
Start: 2025-02-25

## 2025-03-18 ENCOUNTER — TELEMEDICINE (OUTPATIENT)
Dept: FAMILY MEDICINE CLINIC | Age: 65
End: 2025-03-18
Payer: MEDICARE

## 2025-03-18 DIAGNOSIS — K75.4 AUTOIMMUNE HEPATITIS (HCC): ICD-10-CM

## 2025-03-18 DIAGNOSIS — E11.9 TYPE 2 DIABETES MELLITUS WITHOUT COMPLICATION, WITHOUT LONG-TERM CURRENT USE OF INSULIN: ICD-10-CM

## 2025-03-18 DIAGNOSIS — B96.89 ACUTE BACTERIAL SINUSITIS: Primary | ICD-10-CM

## 2025-03-18 DIAGNOSIS — J01.90 ACUTE BACTERIAL SINUSITIS: Primary | ICD-10-CM

## 2025-03-18 PROCEDURE — 1160F RVW MEDS BY RX/DR IN RCRD: CPT | Performed by: NURSE PRACTITIONER

## 2025-03-18 PROCEDURE — 99213 OFFICE O/P EST LOW 20 MIN: CPT | Performed by: NURSE PRACTITIONER

## 2025-03-18 PROCEDURE — 1123F ACP DISCUSS/DSCN MKR DOCD: CPT | Performed by: NURSE PRACTITIONER

## 2025-03-18 PROCEDURE — 3044F HG A1C LEVEL LT 7.0%: CPT | Performed by: NURSE PRACTITIONER

## 2025-03-18 PROCEDURE — 1159F MED LIST DOCD IN RCRD: CPT | Performed by: NURSE PRACTITIONER

## 2025-03-18 PROCEDURE — G2211 COMPLEX E/M VISIT ADD ON: HCPCS | Performed by: NURSE PRACTITIONER

## 2025-03-18 RX ORDER — DOXYCYCLINE HYCLATE 100 MG
100 TABLET ORAL 2 TIMES DAILY
Qty: 14 TABLET | Refills: 0 | Status: SHIPPED | OUTPATIENT
Start: 2025-03-18 | End: 2025-03-25

## 2025-03-18 ASSESSMENT — ENCOUNTER SYMPTOMS
NAUSEA: 0
SINUS PAIN: 1
SORE THROAT: 1
SINUS PRESSURE: 1
COUGH: 1
RHINORRHEA: 1
SHORTNESS OF BREATH: 0
VOICE CHANGE: 1
ABDOMINAL PAIN: 0

## 2025-03-18 NOTE — PROGRESS NOTES
light-headedness.   Psychiatric/Behavioral: Negative.         Objective:   Physical Exam  Constitutional:       General: She is not in acute distress.     Appearance: She is not ill-appearing.   Pulmonary:      Effort: Pulmonary effort is normal. No respiratory distress.   Neurological:      Mental Status: She is alert and oriented to person, place, and time.   Psychiatric:         Mood and Affect: Mood normal.         Behavior: Behavior normal.         Assessment:       Diagnosis Orders   1. Acute bacterial sinusitis  doxycycline hyclate (VIBRA-TABS) 100 MG tablet      2. Type 2 diabetes mellitus without complication, without long-term current use of insulin (HCC)        3. Autoimmune hepatitis (HCC)            Plan:     Orders as above   Fluids and rest  RTO if symptoms worsen or stay the same          Yessenia Elkins, was evaluated through a synchronous (real-time) audio-video encounter. The patient (or guardian if applicable) is aware that this is a billable service, which includes applicable co-pays. This Virtual Visit was conducted with patient's (and/or legal guardian's) consent. Patient identification was verified, and a caregiver was present when appropriate.   The patient was located at Home: [address] [place] [id number]  Provider was located at Facility (Appt Dept): [address]  [place] [address]  Confirm you are appropriately licensed, registered, or certified to deliver care in the state where the patient is located as indicated above. If you are not or unsure, please re-schedule the visit: Yes, I confirm.        Total time spent for this encounter: Not billed by time    --VIVIEN Velazquez CNP on 3/18/2025 at 8:25 AM    An electronic signature was used to authenticate this note.

## 2025-07-31 SDOH — HEALTH STABILITY: PHYSICAL HEALTH: ON AVERAGE, HOW MANY MINUTES DO YOU ENGAGE IN EXERCISE AT THIS LEVEL?: 0 MIN

## 2025-07-31 SDOH — HEALTH STABILITY: PHYSICAL HEALTH: ON AVERAGE, HOW MANY DAYS PER WEEK DO YOU ENGAGE IN MODERATE TO STRENUOUS EXERCISE (LIKE A BRISK WALK)?: 0 DAYS

## 2025-07-31 ASSESSMENT — PATIENT HEALTH QUESTIONNAIRE - PHQ9
SUM OF ALL RESPONSES TO PHQ QUESTIONS 1-9: 1
SUM OF ALL RESPONSES TO PHQ QUESTIONS 1-9: 1
1. LITTLE INTEREST OR PLEASURE IN DOING THINGS: NOT AT ALL
SUM OF ALL RESPONSES TO PHQ QUESTIONS 1-9: 1
2. FEELING DOWN, DEPRESSED OR HOPELESS: SEVERAL DAYS
SUM OF ALL RESPONSES TO PHQ QUESTIONS 1-9: 1

## 2025-07-31 ASSESSMENT — LIFESTYLE VARIABLES
HOW MANY STANDARD DRINKS CONTAINING ALCOHOL DO YOU HAVE ON A TYPICAL DAY: PATIENT DOES NOT DRINK
HOW OFTEN DO YOU HAVE A DRINK CONTAINING ALCOHOL: 1
HOW OFTEN DO YOU HAVE A DRINK CONTAINING ALCOHOL: NEVER
HOW MANY STANDARD DRINKS CONTAINING ALCOHOL DO YOU HAVE ON A TYPICAL DAY: 0
HOW OFTEN DO YOU HAVE SIX OR MORE DRINKS ON ONE OCCASION: 1

## 2025-08-01 ENCOUNTER — OFFICE VISIT (OUTPATIENT)
Dept: FAMILY MEDICINE CLINIC | Age: 65
End: 2025-08-01

## 2025-08-01 ENCOUNTER — LAB (OUTPATIENT)
Dept: LAB | Age: 65
End: 2025-08-01

## 2025-08-01 VITALS
WEIGHT: 155.4 LBS | HEART RATE: 72 BPM | RESPIRATION RATE: 16 BRPM | HEIGHT: 62 IN | BODY MASS INDEX: 28.6 KG/M2 | SYSTOLIC BLOOD PRESSURE: 128 MMHG | DIASTOLIC BLOOD PRESSURE: 72 MMHG

## 2025-08-01 DIAGNOSIS — I10 ESSENTIAL HYPERTENSION: ICD-10-CM

## 2025-08-01 DIAGNOSIS — Z00.00 MEDICARE ANNUAL WELLNESS VISIT, SUBSEQUENT: Primary | ICD-10-CM

## 2025-08-01 DIAGNOSIS — K75.4 AUTOIMMUNE HEPATITIS (HCC): ICD-10-CM

## 2025-08-01 DIAGNOSIS — E78.00 PURE HYPERCHOLESTEROLEMIA: ICD-10-CM

## 2025-08-01 DIAGNOSIS — F33.0 MILD EPISODE OF RECURRENT MAJOR DEPRESSIVE DISORDER: ICD-10-CM

## 2025-08-01 DIAGNOSIS — E11.9 TYPE 2 DIABETES MELLITUS WITHOUT COMPLICATION, WITHOUT LONG-TERM CURRENT USE OF INSULIN (HCC): ICD-10-CM

## 2025-08-01 DIAGNOSIS — E03.8 SUBCLINICAL HYPOTHYROIDISM: ICD-10-CM

## 2025-08-01 LAB
ALBUMIN SERPL BCG-MCNC: 3.8 G/DL (ref 3.4–4.9)
ALP SERPL-CCNC: 640 U/L (ref 38–126)
ALT SERPL W/O P-5'-P-CCNC: 72 U/L (ref 10–35)
ANION GAP SERPL CALC-SCNC: 13 MEQ/L (ref 8–16)
AST SERPL-CCNC: 104 U/L (ref 10–35)
BASOPHILS ABSOLUTE: 0.1 THOU/MM3 (ref 0–0.1)
BASOPHILS NFR BLD AUTO: 1.3 %
BILIRUB SERPL-MCNC: 1.4 MG/DL (ref 0.3–1.2)
BUN SERPL-MCNC: 15 MG/DL (ref 8–23)
CALCIUM SERPL-MCNC: 9.8 MG/DL (ref 8.8–10.2)
CHLORIDE SERPL-SCNC: 103 MEQ/L (ref 98–111)
CO2 SERPL-SCNC: 25 MEQ/L (ref 22–29)
CREAT SERPL-MCNC: 0.8 MG/DL (ref 0.5–0.9)
DEPRECATED MEAN GLUCOSE BLD GHB EST-ACNC: 87 MG/DL (ref 70–126)
DEPRECATED RDW RBC AUTO: 51.7 FL (ref 35–45)
EOSINOPHIL NFR BLD AUTO: 4 %
EOSINOPHILS ABSOLUTE: 0.2 THOU/MM3 (ref 0–0.4)
ERYTHROCYTE [DISTWIDTH] IN BLOOD BY AUTOMATED COUNT: 14.9 % (ref 11.5–14.5)
GFR SERPL CREATININE-BSD FRML MDRD: 80 ML/MIN/1.73M2
GLUCOSE SERPL-MCNC: 87 MG/DL (ref 74–109)
HBA1C MFR BLD HPLC: 4.9 % (ref 4–6)
HCT VFR BLD AUTO: 41 % (ref 37–47)
HGB BLD-MCNC: 13.3 GM/DL (ref 12–16)
IMM GRANULOCYTES # BLD AUTO: 0.01 THOU/MM3 (ref 0–0.07)
IMM GRANULOCYTES NFR BLD AUTO: 0.2 %
LYMPHOCYTES ABSOLUTE: 1.3 THOU/MM3 (ref 1–4.8)
LYMPHOCYTES NFR BLD AUTO: 28 %
MCH RBC QN AUTO: 30.6 PG (ref 26–33)
MCHC RBC AUTO-ENTMCNC: 32.4 GM/DL (ref 32.2–35.5)
MCV RBC AUTO: 94.5 FL (ref 81–99)
MONOCYTES ABSOLUTE: 0.4 THOU/MM3 (ref 0.4–1.3)
MONOCYTES NFR BLD AUTO: 8.1 %
NEUTROPHILS ABSOLUTE: 2.7 THOU/MM3 (ref 1.8–7.7)
NEUTROPHILS NFR BLD AUTO: 58.4 %
NRBC BLD AUTO-RTO: 0 /100 WBC
PLATELET # BLD AUTO: 147 THOU/MM3 (ref 130–400)
PMV BLD AUTO: 11.5 FL (ref 9.4–12.4)
POTASSIUM SERPL-SCNC: 4 MEQ/L (ref 3.5–5.2)
PROT SERPL-MCNC: 7.4 G/DL (ref 6.4–8.3)
RBC # BLD AUTO: 4.34 MILL/MM3 (ref 4.2–5.4)
SODIUM SERPL-SCNC: 141 MEQ/L (ref 135–145)
T4 FREE SERPL-MCNC: 1.1 NG/DL (ref 0.92–1.68)
TSH SERPL DL<=0.05 MIU/L-ACNC: 1.83 UIU/ML (ref 0.27–4.2)
WBC # BLD AUTO: 4.7 THOU/MM3 (ref 4.8–10.8)

## 2025-08-01 RX ORDER — TIRZEPATIDE 5 MG/.5ML
5 INJECTION, SOLUTION SUBCUTANEOUS WEEKLY
Qty: 2 ML | Refills: 5 | Status: SHIPPED | OUTPATIENT
Start: 2025-08-01

## 2025-08-01 ASSESSMENT — PATIENT HEALTH QUESTIONNAIRE - PHQ9
9. THOUGHTS THAT YOU WOULD BE BETTER OFF DEAD, OR OF HURTING YOURSELF: NOT AT ALL
5. POOR APPETITE OR OVEREATING: NOT AT ALL
7. TROUBLE CONCENTRATING ON THINGS, SUCH AS READING THE NEWSPAPER OR WATCHING TELEVISION: NOT AT ALL
SUM OF ALL RESPONSES TO PHQ QUESTIONS 1-9: 0
8. MOVING OR SPEAKING SO SLOWLY THAT OTHER PEOPLE COULD HAVE NOTICED. OR THE OPPOSITE, BEING SO FIGETY OR RESTLESS THAT YOU HAVE BEEN MOVING AROUND A LOT MORE THAN USUAL: NOT AT ALL
10. IF YOU CHECKED OFF ANY PROBLEMS, HOW DIFFICULT HAVE THESE PROBLEMS MADE IT FOR YOU TO DO YOUR WORK, TAKE CARE OF THINGS AT HOME, OR GET ALONG WITH OTHER PEOPLE: NOT DIFFICULT AT ALL
4. FEELING TIRED OR HAVING LITTLE ENERGY: NOT AT ALL
3. TROUBLE FALLING OR STAYING ASLEEP: NOT AT ALL
6. FEELING BAD ABOUT YOURSELF - OR THAT YOU ARE A FAILURE OR HAVE LET YOURSELF OR YOUR FAMILY DOWN: NOT AT ALL
2. FEELING DOWN, DEPRESSED OR HOPELESS: NOT AT ALL

## 2025-08-01 ASSESSMENT — ENCOUNTER SYMPTOMS
COUGH: 0
SHORTNESS OF BREATH: 0
ABDOMINAL PAIN: 0
NAUSEA: 0

## 2025-08-01 NOTE — PROGRESS NOTES
Mammogram scheduled for next week on [facility].  Seen liver specialist- two EGDs and a liver biopsy and stated it is early stage of Cirrhosis.  
Subjective:      Patient ID: Yessenia Elkins 1960 is a 65 y.o. female here for evaluation.     Chief Complaint   Patient presents with    Medicare AWV       Patient Active Problem List   Diagnosis    Epigastric abdominal pain    High anion gap metabolic acidosis    Essential hypertension    Type 2 diabetes mellitus without complication (HCC)    Elevated LFTs    Ulnar neuritis, right    Pure hypercholesterolemia    Primary biliary cholangitis (HCC)    Anxiety disorder    Autoimmune hepatitis (HCC)    Cellulitis of toe    Gastroesophageal reflux disease without esophagitis    Mild episode of recurrent major depressive disorder    Viral upper respiratory tract infection       COLONOSCOPY - 2018  MAMMO - 2023  DEXA - 2023    GASTRO/LIVER - Dr. Levine    CBD and Autoimmune Hepatitis.   Following with [facility].  Early Stages of Cirrhosis.  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   Had 7 varicose veins tied off - repeat EGD showed stability of this.    Denies CP, SOB or chest tightness.  No smoking.     Vitals:    08/01/25 0957   BP: 128/72   Pulse: 72   Resp: 16        ON Lisinopril 20 mg    BP Readings from Last 3 Encounters:   08/01/25 128/72   02/20/25 128/74   08/22/24 102/66       Wt Readings from Last 3 Encounters:   08/01/25 70.5 kg (155 lb 6.4 oz)   02/20/25 75.1 kg (165 lb 8 oz)   08/22/24 86.4 kg (190 lb 6.4 oz)       SE with Ozempic and metformin.  On Mounajro 5 mg.   40# lost!!    Labs reviewed.     Lab Results   Component Value Date    LABA1C 4.9 02/21/2025    LABA1C 6.5 (H) 08/22/2024    LABA1C 5.0 05/26/2020     Lab Results   Component Value Date    EAG 87 02/21/2025       No components found for: \"CHLPL\"  Lab Results   Component Value Date    TRIG 148 02/21/2025    TRIG 149 05/26/2020     Lab Results   Component Value Date    HDL 56 02/21/2025    HDL 55 05/26/2020    HDL 46 09/07/2017     No components found for: \"LDLCALC\"    No components found for: \"LABVLDL\"      Chemistry        Component Value 
into the skin once a week Yes Anderson Gallegos APRN - CNP   budesonide (ENTOCORT EC) 3 MG delayed release capsule Take 2 capsules by mouth 3 times daily Yes Anderson Gallegos APRN - CNP   ursodiol (ACTIGALL) 500 MG tablet Take 0.5 tablets by mouth 3 times daily  Patient taking differently: Take 0.5 tablets by mouth 2 times daily  Isabelle Mcbride APRN - CNP       CareTeam (Including outside providers/suppliers regularly involved in providing care):   Patient Care Team:  Anderson Gallegos APRN - CNP as PCP - General (Family Nurse Practitioner)  Anderson Gallegos APRN - CNP as PCP - Empaneled Provider     Recommendations for Preventive Services Due: see orders and patient instructions/AVS.  Recommended screening schedule for the next 5-10 years is provided to the patient in written form: see Patient Instructions/AVS.     Reviewed and updated this visit:  Tobacco  Allergies  Meds  Problems

## 2025-08-01 NOTE — PATIENT INSTRUCTIONS
sports drinks.  Where can you learn more?  Go to https://www.[employer].net/patientEd and enter T756 to learn more about \"Eating Healthy Foods: Care Instructions.\"  Current as of: October 7, 2024  Content Version: 14.5  © 1756-4984 [employer].   Care instructions adapted under license by [employer]. If you have questions about a medical condition or this instruction, always ask your healthcare professional. [employer], [employer], disclaims any warranty or liability for your use of this information.         A Healthy Heart: Care Instructions  Overview     Coronary artery disease, also called heart disease, occurs when a substance called plaque builds up in the vessels that supply oxygen-rich blood to your heart muscle. This can narrow the blood vessels and reduce blood flow. A heart attack happens when blood flow is completely blocked. A high-fat diet, smoking, and other factors increase the risk of heart disease.  Your doctor has found that you have a chance of having heart disease. A heart-healthy lifestyle can help keep your heart healthy and prevent heart disease. This lifestyle includes eating healthy, being active, staying at a weight that's healthy for you, and not smoking or using tobacco. It also includes taking medicines as directed, managing other health conditions, and trying to get a healthy amount of sleep.  Follow-up care is a key part of your treatment and safety. Be sure to make and go to all appointments, and call your doctor if you are having problems. It's also a good idea to know your test results and keep a list of the medicines you take.  How can you care for yourself at home?  Diet    Use less salt when you cook and eat. This helps lower your blood pressure. Taste food before salting. Add only a little salt when you think you need it. With time, your taste buds will adjust to less salt.     Eat fewer snack items, fast foods, canned soups, and other high-salt, high-fat, processed

## 2025-08-04 LAB — MITOCHONDRIAL M2 AB, IGG: 1 U/ML (ref 0–4)

## (undated) DEVICE — CONMED SCOPE SAVER BITE BLOCK, 20X27 MM: Brand: SCOPE SAVER

## (undated) DEVICE — ENDO KIT: Brand: MEDLINE INDUSTRIES, INC.

## (undated) DEVICE — DEFENDO AIR WATER SUCTION AND BIOPSY VALVE KIT FOR  OLYMPUS: Brand: DEFENDO AIR/WATER/SUCTION AND BIOPSY VALVE

## (undated) DEVICE — FORCEP RAD JAW W/NEEDLE 160CM